# Patient Record
Sex: FEMALE | Race: WHITE | NOT HISPANIC OR LATINO | Employment: FULL TIME | URBAN - METROPOLITAN AREA
[De-identification: names, ages, dates, MRNs, and addresses within clinical notes are randomized per-mention and may not be internally consistent; named-entity substitution may affect disease eponyms.]

---

## 2018-08-30 ENCOUNTER — EVALUATION (OUTPATIENT)
Dept: PHYSICAL THERAPY | Facility: CLINIC | Age: 48
End: 2018-08-30
Payer: COMMERCIAL

## 2018-08-30 DIAGNOSIS — Z96.642 STATUS POST TOTAL HIP REPLACEMENT, LEFT: Primary | ICD-10-CM

## 2018-08-30 PROCEDURE — G8978 MOBILITY CURRENT STATUS: HCPCS

## 2018-08-30 PROCEDURE — 97161 PT EVAL LOW COMPLEX 20 MIN: CPT

## 2018-08-30 PROCEDURE — G8979 MOBILITY GOAL STATUS: HCPCS

## 2018-08-30 NOTE — PROGRESS NOTES
PT Evaluation     Today's date: 2018  Patient name: Prachi Paiz  : 1970  MRN: [id number]  Referring provider: Kale Kahn MD  Dx:   Encounter Diagnosis     ICD-10-CM    1  Status post total hip replacement, left N21 078        Start Time: 1435  Stop Time: 1530  Total time in clinic (min): 55 minutes    Assessment  Impairments: abnormal gait, abnormal muscle firing, abnormal or restricted ROM, abnormal movement, activity intolerance, impaired balance, impaired physical strength, lacks appropriate home exercise program, pain with function and poor body mechanics  Functional limitations: Comunity ambulation, stair negotiation, squatting  Assessment details: Prachi Paiz is a 49 y o  female who presents with pain, decreased strength, decreased ROM, decreased joint mobility, ambulatory dysfunction and postural  dysfunction  Due to these impairments, patient has difficulty performing ADL's, recreational activities, work-related activities, engaging in social activities, ambulation, stair negotiation, transfers  Patient's clinical presentation is consistent with their referring diagnosis of Status post total hip replacement, left  (primary encounter diagnosis)  Patient has been educated in home exercise program and plan of care  Patient would benefit from skilled physical therapy services to address their aforementioned functional limitations and progress towards prior level of function and independence with home exercise program      Understanding of Dx/Px/POC: good   Prognosis: good    Goals  Short Term Goals: Target Date 18  1  Initiate and advance HEP  2  Increase left hip AROM by at least 20 deg  3  Increase left LE MMT by at least 1/2 grade  4  Pt will be able to ambulate community distances without cane  5  Pt will be able to ascend stairs with a Reciprocal pattern with UE support   6   Pt will be able to squat to approx 90 deg knee flexion using proper form with PT cuing    Long Term Goals: Target Date 10/11/18  1  Indep with HEP  2  Increase right hip AROM to WNL in all planes  3  Increase left LE MMT to at least 4+/5  4  Pt will be able to ambulate community distances without gross deviation  5  Pt will be able to negotiate stairs with a reciprocal pattern without UE support   6  Pt will be able to perform a full squat using proper form without PT cuing  7  Pt will be able to return to PLOF including LE resistance training and cycling without limitation             Plan  Patient would benefit from: skilled PT  Planned modality interventions: cryotherapy, electrical stimulation/Russian stimulation and thermotherapy: hydrocollator packs  Planned therapy interventions: joint mobilization, manual therapy, patient education, postural training, activity modification, abdominal trunk stabilization, body mechanics training, flexibility, functional ROM exercises, graded exercise, home exercise program, neuromuscular re-education, strengthening, stretching, therapeutic activities, therapeutic exercise, motor coordination training, muscle pump exercises, gait training, balance/weight bearing training and ADL training  Frequency: 2x week  Duration in weeks: 6  Plan of Care beginning date: 8/30/2018  Plan of Care expiration date: 10/11/2018  Treatment plan discussed with: patient        Subjective Evaluation    History of Present Illness  Mechanism of injury: Pt reports of an approx 4 year history of L hip pain that began with a  "clicking" sensation during ambulation  Over time the pain progressed, and she began experiencing sharp pain with hip IR  Saw an ortho who performed performed x-ray (OA, osteoporosis) and MRI  Pain has been managed conservatively with injections x4 over the past year and PRP injection with good relief  Pain continued to increase post injections, so she decided to undergo a L THR on 8/21/18 with Dr Shira Gee    Pt received home PT for 3 visits before D/C, and states she has been ambulating 1 5 to 2 miles  Quality of life: good    Pain  Current pain ratin  At best pain ratin  At worst pain ratin (After walking 2  miles)  Location: L Groin and Ant thigh (Occasional L piriformis/"nerve pain")  Relieving factors: ice and rest  Aggravating factors: sitting (Walking >1 5 mils, don socks/shoes)  Progression: improved    Social Support  Steps to enter house: yes (2)  Stairs in house: yes (14)   Lives in: multiple-level home      Diagnostic Tests  X-ray: abnormal  MRI studies: abnormal  Treatments  Previous treatment: physical therapy  Current treatment: physical therapy  Patient Goals  Patient goals for therapy: decreased pain, improved balance, increased motion and return to sport/leisure activities  Patient goal: Return to cycling, LE resistance training and walking for exercise        Objective     Static Posture   General Observations  Asymmetrical weight bearing and shifted right  Comments  Pt reports L leg length discrepancy was fixed during the procedure    Observations   Left Hip  Positive for incision  Tenderness   Left Knee   Tenderness in the patellar tendon  Lumbar Screen  Lumbar range of motion within normal limits      Active Range of Motion   Left Hip   Flexion: 90 degrees with pain  Extension: 10 degrees   Abduction: 25 degrees   External rotation (90/90): 35 degrees   External rotation (prone): 10 degrees with pain    Right Hip   Flexion: 110 degrees   Extension: 20 degrees   Abduction: 40 degrees   External rotation (90/90): 85 degrees   External rotation (prone): 15 degrees   Left Knee   Prone flexion: 90 degrees with pain    Right Knee   Normal active range of motion  Prone flexion: 120 degrees   Left Ankle/Foot   Normal active range of motion    Right Ankle/Foot   Normal active range of motion    Passive Range of Motion   Left Hip   Flexion: 100 degrees   Abduction: 30 degrees     Right Hip   Normal passive range of motion    Strength/Myotome Testing Left Hip   Planes of Motion   Flexion: 3-  Extension: 4  Abduction: 3+  Adduction: 2+  External rotation: 3-  Internal rotation: 2+    Right Hip   Normal muscle strength    Left Knee   Flexion: 3+  Extension: 4- (pain)    Right Knee   Flexion: WFL  Extension: WFL    Left Ankle/Foot   Dorsiflexion: 5  Plantar flexion: 4+    Right Ankle/Foot   Normal strength    Tests     Left Hip   Modified Arsh: Positive  90/90 SLR: Positive  SLR: Positive  Ambulation     Ambulation: Stairs   Pattern: non-reciprocal  Railings: one rail  Pattern: non-reciprocal  Railings: one rail    Observational Gait   Gait: antalgic   Decreased walking speed, left swing time and left step length  Left foot contact pattern: foot flat    Functional Assessment     Comments  Squat:  Pt is able to squat to approx 100 deg knee flexion demonstrating R weight shift and increased lumbar flexion secondary to hip flexion restriction  Pt reports of anterior hip discomfort        Flowsheet Rows      Most Recent Value   PT/OT G-Codes   Current Score  47   Projected Score  69   FOTO information reviewed  Yes   Assessment Type  Evaluation   G code set  Mobility: Walking & Moving Around   Mobility: Walking and Moving Around Current Status ()  CK   Mobility: Walking and Moving Around Goal Status ()  CJ          Precautions: L Ant THR    Specialty Daily Treatment Diary       Manual 8/30/18       PROM        STM        Joint mobs        Man Flexibility                        Exercise Diary         Rec bike        Supine clamshells 10x5"       Ball squeeze 10x5"       Prone quad stretch 3x30"       Bridges        Quarter squats        Towel hip flexion 10x10"       Hamstring stretch 10x10"                                                                               Modalities

## 2018-09-04 ENCOUNTER — OFFICE VISIT (OUTPATIENT)
Dept: PHYSICAL THERAPY | Facility: CLINIC | Age: 48
End: 2018-09-04
Payer: COMMERCIAL

## 2018-09-04 DIAGNOSIS — Z96.642 STATUS POST TOTAL HIP REPLACEMENT, LEFT: Primary | ICD-10-CM

## 2018-09-04 PROCEDURE — 97140 MANUAL THERAPY 1/> REGIONS: CPT

## 2018-09-04 PROCEDURE — 97110 THERAPEUTIC EXERCISES: CPT

## 2018-09-04 NOTE — PROGRESS NOTES
Daily Note     Today's date: 2018  Patient name: Tenzin Allen  : 1970  MRN: [id number]  Referring provider: John Denny MD  Dx:   Encounter Diagnosis   Name Primary?  Status post total hip replacement, left Yes       Start Time: 930  Stop Time: 102  Total time in clinic (min): 50 minutes    Subjective: Pt reports her hip has been feeling much better since performing hip flexor/quad stretching   Mild soreness with prolonged positioning, but improved with motion  Able to don/doff socks and shoes with less pain  Pain Ratin/10    Objective: See treatment diary below  Precautions: L Ant THR    Specialty Daily Treatment Diary       Manual 18       PROM All planes        IASTM Quads/ITB/ adductors w/the stick       Joint mobs        Man Flexibility HS/Quads                       Exercise Diary         Stationary bike x10 mins L5       Supine clamshells 20x5" green       Ball squeeze 10x5"/10x5" w/hip flex       Prone quad stretch        Bridges On PB 20x3"       Wall squats 20x2" on PB       PPU  2x10       TKE [place] 20x5" 6 0       Lat band walks  6x25ft red       Step ups 2x10 8in                                                               Modalities                            Assessment: Pt demonstrates improved hip mobility in all planes, most notably ER  Passive flex to approx 105 deg with decreased lumbar compensation  Using AD intermittently with community ambulation, no longer uses at home  Plan: Continue per plan of care  Progress treatment as tolerated

## 2018-09-05 ENCOUNTER — OFFICE VISIT (OUTPATIENT)
Dept: PHYSICAL THERAPY | Facility: CLINIC | Age: 48
End: 2018-09-05
Payer: COMMERCIAL

## 2018-09-05 DIAGNOSIS — Z96.642 STATUS POST TOTAL HIP REPLACEMENT, LEFT: Primary | ICD-10-CM

## 2018-09-05 PROCEDURE — 97110 THERAPEUTIC EXERCISES: CPT

## 2018-09-05 PROCEDURE — 97140 MANUAL THERAPY 1/> REGIONS: CPT

## 2018-09-05 PROCEDURE — 97112 NEUROMUSCULAR REEDUCATION: CPT

## 2018-09-05 NOTE — PROGRESS NOTES
Daily Note     Today's date: 2018  Patient name: Simon Jaffe  : 1970  MRN: [id number]  Referring provider: Dawn Thomson MD  Dx:   Encounter Diagnosis   Name Primary?  Status post total hip replacement, left Yes       Start Time: 1045  Stop Time: 1140  Total time in clinic (min): 55 minutes    Subjective: Pt reports of serge LE muscle soreness today but denies hip pain  States she awaoke and was able to walk without limping  Has not had L knee pain since beginning rectus femoris stretching  Pain Ratin/10    Objective: See treatment diary below  Precautions: L [facility]    Specialty Daily Treatment Diary       Manual 18      PROM All planes  All planes      IASTM Quads/ITB/ adductors w/the stick Quads/ITB/ adductors w/the stick      Joint mobs        Man Flexibility HS/Quads HS/Rectus fem                      Exercise Diary         Stationary bike x10 mins L5 x10 mins L5      Supine clamshells 20x5" green s/l 20x5" red       Ball squeeze 10x5"/10x5" w/hip flex 10x5"/10x5" w/hip flex      Prone quad stretch  5x20"      Bridges On PB 20x3" On PB 20x3"      Wall squats 20x2" on PB Table 2x10      PPU  2x10 2x10      TKE [place] 20x5" 6 0 Standing hip ext/abd/flex 20x3 serge 3lbs      Lat band walks  6x25ft red Prone ext/abd 2x10 serge      Step ups 2x10 8in Lat step down 2x10 serge 6in                                                              Modalities                            Assessment: Pt demonstrates improved prone knee flexion ROM without c/o  Cuing required with standing activity to prevent R LE weight shift  Challenged by Sl eccentric step downs  Plan: Continue per plan of care  Progress treatment as tolerated

## 2018-09-06 ENCOUNTER — OFFICE VISIT (OUTPATIENT)
Dept: PHYSICAL THERAPY | Facility: CLINIC | Age: 48
End: 2018-09-06
Payer: COMMERCIAL

## 2018-09-06 DIAGNOSIS — Z96.642 STATUS POST TOTAL HIP REPLACEMENT, LEFT: Primary | ICD-10-CM

## 2018-09-06 PROCEDURE — 97140 MANUAL THERAPY 1/> REGIONS: CPT

## 2018-09-06 PROCEDURE — 97110 THERAPEUTIC EXERCISES: CPT

## 2018-09-06 PROCEDURE — 97112 NEUROMUSCULAR REEDUCATION: CPT

## 2018-09-06 NOTE — PROGRESS NOTES
Daily Note     Today's date: 2018  Patient name: Mary Ellen James  : 1970  MRN: [id number]  Referring provider: Michael Ramírez MD  Dx:   Encounter Diagnosis   Name Primary?  Status post total hip replacement, left Yes       Start Time: 1400  Stop Time: 1450  Total time in clinic (min): 50 minutes    Subjective: Pt reports of mild soreness after previous session  States she forgot to take her celebrex and had mild pain this morning, abolished once she took her medication  Pain Ratin/10    Objective: See treatment diary below  Precautions: L Ant [facility]    Specialty Daily Treatment Diary       Manual 18     PROM All planes  All planes All planes     IASTM Quads/ITB/ adductors w/the stick Quads/ITB/ adductors w/the stick Quads/ITB/ adductors w/the stick     Joint mobs        Man Flexibility HS/Quads HS/Rectus fem HS/Rectus fem                     Exercise Diary         Stationary bike x10 mins L5 x10 mins L5 x10 mins L1     Supine clamshells 20x5" green s/l 20x5" red  s/l 20x5"     Ball squeeze 10x5"/10x5" w/hip flex 10x5"/10x5" w/hip flex LTR 20x5" serge     Prone quad stretch  5x20" 5x20"     Bridges On PB 20x3" On PB 20x3" DKTC on PB 20x10"     Wall squats 20x2" on PB Table 2x10 Table 2x10     PPU  2x10 2x10 2x10     TKE [place] 20x5" 6 0 Standing hip ext/abd/flex 20x3 serge 3lbs [place] 20x5" 7 0     Lat band walks  6x25ft red Prone ext/abd 2x10 serge SLR abd 2x10     Step ups 2x10 8in Lat step down 2x10 serge 6in 2x10 8in                                                             Modalities                            Assessment: Pt continues to demonstrate improved hip mobility in all planes  Continues to require cuing to correct R deviation with increased squat depth  Advised to rest for a few days over the weekend, but continue with walking and stretching  Plan: Continue per plan of care  Progress treatment as tolerated

## 2018-09-11 ENCOUNTER — OFFICE VISIT (OUTPATIENT)
Dept: PHYSICAL THERAPY | Facility: CLINIC | Age: 48
End: 2018-09-11
Payer: COMMERCIAL

## 2018-09-11 DIAGNOSIS — Z96.642 STATUS POST TOTAL HIP REPLACEMENT, LEFT: Primary | ICD-10-CM

## 2018-09-11 PROCEDURE — 97110 THERAPEUTIC EXERCISES: CPT

## 2018-09-11 PROCEDURE — 97140 MANUAL THERAPY 1/> REGIONS: CPT

## 2018-09-11 PROCEDURE — 97112 NEUROMUSCULAR REEDUCATION: CPT

## 2018-09-11 NOTE — PROGRESS NOTES
Daily Note     Today's date: 2018  Patient name: Desiree Spine  : 1970  MRN: [id number]  Referring provider: Fely Flores MD  Dx:   Encounter Diagnosis   Name Primary?  Status post total hip replacement, left Yes       Start Time: 1720  Stop Time: 1830  Total time in clinic (min): 70 minutes    Subjective: Pt reports she has been able to increase her walking to 3 miles with minimal difficulty  Has tightness post, denies pain or increased soreness  Was able to stand and pedal on her spin bike this weekend, and has decreased soreness and difficulty with don/doffing shoes and socks    Pain Ratin/10    Objective: See treatment diary below  Precautions: L Ant [facility]    Specialty Daily Treatment Diary       Manual 18    PROM All planes  All planes All planes All planes    IASTM Quads/ITB/ adductors w/the stick Quads/ITB/ adductors w/the stick Quads/ITB/ adductors w/the stick Quads/ITB/ adductors w/the stick    Joint mobs        Man Flexibility HS/Quads HS/Rectus fem HS/Rectus fem HS/Rectus fem                    Exercise Diary         Stationary bike x10 mins L5 x10 mins L5 x10 mins L1 x5 mins L5/ Eliptical x5 mins L7 retro    Supine clamshells 20x5" green s/l 20x5" red  s/l 20x5" Short bridge 20x5" red    Ball squeeze 10x5"/10x5" w/hip flex 10x5"/10x5" w/hip flex LTR 20x5" serge     Prone quad stretch  5x20" 5x20" 5x20"    Bridges On PB 20x3" On PB 20x3" DKTC on PB 20x10" SL 10x3"/On PB 10x3"/alt SLR x10bil    Wall squats 20x2" on PB Table 2x10 Table 2x10 20x5"    PPU  2x10 2x10 2x10 2x10    TKE [place] 20x5" 6 0 Standing hip ext/abd/flex 20x3 serge 3lbs [place] 20x5" 7 0 [place] 3 way hip x15ea 1 5    Lat band walks  6x25ft red Prone ext/abd 2x10 serge SLR abd 2x10 4x25ft red w/squat    Step ups 2x10 8in Lat step down 2x10 serge 6in 2x10 8in     BOSU step ups    20x3" serge    Rev Lunges    w/sliders x10 serge                                            Modalities Assessment: Pt challeneged by L LE stabilization activity but denies pain  Requires cuing to prevent excessive L toe out  Plan: Continue per plan of care  Progress treatment as tolerated

## 2018-09-12 ENCOUNTER — OFFICE VISIT (OUTPATIENT)
Dept: PHYSICAL THERAPY | Facility: CLINIC | Age: 48
End: 2018-09-12
Payer: COMMERCIAL

## 2018-09-12 DIAGNOSIS — Z96.642 STATUS POST TOTAL HIP REPLACEMENT, LEFT: Primary | ICD-10-CM

## 2018-09-12 PROCEDURE — 97140 MANUAL THERAPY 1/> REGIONS: CPT

## 2018-09-12 PROCEDURE — 97110 THERAPEUTIC EXERCISES: CPT

## 2018-09-12 PROCEDURE — 97112 NEUROMUSCULAR REEDUCATION: CPT

## 2018-09-12 NOTE — PROGRESS NOTES
Daily Note     Today's date: 2018  Patient name: Michelle Freeman  : 1970  MRN: [id number]  Referring provider: Francisco Jones MD  Dx:   Encounter Diagnosis   Name Primary?  Status post total hip replacement, left Yes       Start Time: 1045  Stop Time: 1135  Total time in clinic (min): 50 minutes    Subjective: Pt reports of mild soreness after previous session  Felt better after walking this morning    Pain Ratin/10    Objective: See treatment diary below  [facility]    Specialty Daily Treatment Diary       Manual 18   PROM All planes  All planes All planes All planes All planes   IASTM Quads/ITB/ adductors w/the stick Quads/ITB/ adductors w/the stick Quads/ITB/ adductors w/the stick Quads/ITB/ adductors w/the stick Quads/ITB/ adductors w/the stick   Joint mobs        Man Flexibility HS/Quads HS/Rectus fem HS/Rectus fem HS/Rectus fem HS/Rectus fem                   Exercise Diary         Stationary bike x10 mins L5 x10 mins L5 x10 mins L1 x5 mins L5/ Eliptical x5 mins L7 retro Eliptical 5min fwd/retro ea   Supine clamshells 20x5" green s/l 20x5" red  s/l 20x5" Short bridge 20x5" red s/l 20x5" red   Ball squeeze 10x5"/10x5" w/hip flex 10x5"/10x5" w/hip flex LTR 20x5" serge  10x5"/10x5" w/hip flex   Prone quad stretch  5x20" 5x20" 5x20" 5x20"   Bridges On PB 20x3" On PB 20x3" DKTC on PB 20x10" SL 10x3"/On PB 10x3"/alt SLR x10bil SKTC 20x10"   Wall squats 20x2" on PB Table 2x10 Table 2x10 20x5" Table [id number]   PPU  2x10 2x10 2x10 2x10 2x10   TKE [place] 20x5" 6 0 Standing hip ext/abd/flex 20x3 serge 3lbs [place] 20x5" 7 0 [place] 3 way hip x15ea 1 5 Seated knee/hip ext 2x10/standing 2x10 5 lbs   Lat band walks  6x25ft red Prone ext/abd 2x10 serge SLR abd 2x10 4x25ft red w/squat 6x25ft red   Step ups 2x10 8in Lat step down 2x10 serge 6in 2x10 8in  Lat step down 2x10 serge 8in   BOSU step ups    20x3" serge    Rev Lunges    w/sliders x10 serge Modalities                            Assessment: Pt demonstrates improved knee flexion in prone without c/o pain  Fatigues easily with hip flexion strengthening  Plan: Continue per plan of care  Progress treatment as tolerated

## 2018-09-13 ENCOUNTER — OFFICE VISIT (OUTPATIENT)
Dept: PHYSICAL THERAPY | Facility: CLINIC | Age: 48
End: 2018-09-13
Payer: COMMERCIAL

## 2018-09-13 DIAGNOSIS — Z96.642 STATUS POST TOTAL HIP REPLACEMENT, LEFT: Primary | ICD-10-CM

## 2018-09-13 PROCEDURE — 97110 THERAPEUTIC EXERCISES: CPT

## 2018-09-13 PROCEDURE — 97140 MANUAL THERAPY 1/> REGIONS: CPT

## 2018-09-13 PROCEDURE — 97112 NEUROMUSCULAR REEDUCATION: CPT

## 2018-09-13 NOTE — PROGRESS NOTES
Daily Note     Today's date: 2018  Patient name: Sage Mcfadden  : 1970  MRN: [id number]  Referring provider: Ivon Decker MD  Dx:   Encounter Diagnosis   Name Primary?  Status post total hip replacement, left Yes       Start Time: 930  Stop Time: 1020  Total time in clinic (min): 50 minutes    Subjective: Pt reports of increased fatigue today  Was able to cross her legs to don shoes this morning  Pain Ratin/10    Objective: See treatment diary below  Precautions: L Ant [facility]    Specialty Daily Treatment Diary       Manual 18   PROM All planes   All planes All planes All planes   IASTM Quads/ITB/ adductors w/the stick  Quads/ITB/ adductors w/the stick Quads/ITB/ adductors w/the stick Quads/ITB/ adductors w/the stick   Joint mobs        Man Flexibility HS/Rectus fem/piriformis  HS/Rectus fem HS/Rectus fem HS/Rectus fem                   Exercise Diary         Eliptical 5min fwd/retro ea L7  x10 mins L1 x5 mins L5/ Eliptical x5 mins L7 retro Eliptical 5min fwd/retro ea   Supine clamshells Short bridge 20x5"  s/l 20x5" Short bridge 20x5" red s/l 20x5" red   Ball squeeze   LTR 20x5" serge  10x5"/10x5" w/hip flex   Prone quad stretch 5x20"  5x20" 5x20" 5x20"   Bridges SL 10x3"/DL 20x3"  DKTC on PB 20x10" SL 10x3"/On PB 10x3"/alt SLR x10bil SKTC 20x10"   Wall squats 20x5" on PB  Table 2x10 20x5" Table 2x10   PPU  2x10  2x10 2x10 2x10   [place] 3 way hip  x15ea 1 5  [place] 20x5" 7 0 [place] 3 way hip x15ea 1 5 Seated knee/hip ext 2x10/standing 2x10 5 lbs   Lat band walks    SLR abd 2x10 4x25ft red w/squat 6x25ft red   Step ups 2x10 12in  2x10 8in  Lat step down 2x10 serge 8in   BOSU step ups    20x3" serge    Rev Lunges W/sliders x10 serge   w/sliders x10 serge                                            Modalities                            Assessment: Pt demonstrates near full AROM in all planes  Mild L glute weakness and decreased dynamic stability compared to R    Advised to rest over the weekend, focusing on walking/biking and general mobility  Plan: Continue per plan of care  Progress treatment as tolerated

## 2018-09-18 ENCOUNTER — OFFICE VISIT (OUTPATIENT)
Dept: PHYSICAL THERAPY | Facility: CLINIC | Age: 48
End: 2018-09-18
Payer: COMMERCIAL

## 2018-09-18 DIAGNOSIS — Z96.642 STATUS POST TOTAL HIP REPLACEMENT, LEFT: Primary | ICD-10-CM

## 2018-09-18 PROCEDURE — 97110 THERAPEUTIC EXERCISES: CPT

## 2018-09-18 PROCEDURE — 97140 MANUAL THERAPY 1/> REGIONS: CPT

## 2018-09-18 PROCEDURE — 97112 NEUROMUSCULAR REEDUCATION: CPT

## 2018-09-18 NOTE — PROGRESS NOTES
Daily Note     Today's date: 2018  Patient name: Murtaza Robbins  : 1970  MRN: [id number]  Referring provider: Chhaya Sesay MD  Dx:   Encounter Diagnosis   Name Primary?  Status post total hip replacement, left Yes       Start Time: 930  Stop Time: 1030  Total time in clinic (min): 60 minutes    Subjective: Pt reports she did walks of 3 and 4 miles this weekend without AD  Reports her only c/o is prolinged sitting, especially on low surfaces    Pain Ratin/10    Objective: See treatment diary below  Precautions: L Ant [facility]    Specialty Daily Treatment Diary       Manual 18   PROM All planes  All planes  All planes All planes   IASTM Quads/ITB/ adductors w/the stick Quads/ITB/ adductors w/the stick  Quads/ITB/ adductors w/the stick Quads/ITB/ adductors w/the stick   Joint mobs        Man Flexibility HS/Rectus fem/piriformis HS/Rectus fem/piriformis  HS/Rectus fem HS/Rectus fem                   Exercise Diary         Eliptical 5min fwd/retro ea L7 5min fwd/retro ea L7  x5 mins L5/ Eliptical x5 mins L7 retro Eliptical 5min fwd/retro ea   Supine clamshells Short bridge 20x5" red w/abd Short bridge 20x5" red w/abd  Short bridge 20x5" red s/l 20x5" red   Ball squeeze     10x5"/10x5" w/hip flex   Prone quad stretch 5x20" 5x20"  5x20" 5x20"   Bridges On PB 10 w/alt SLR x10 serge and bridge and curl x10 On PB 10 w/alt SLR x10 serge and bridge and curl x10  SL 10x3"/On PB 10x3"/alt SLR x10bil SKTC 20x10"   Wall squats 20x5" on PB 2x10 on PB  20x5" Table 2x10   PPU  2x10 2x10  2x10 2x10   [place] 3 way hip  x15ea 2 0  x15ea 2 0  [place] 3 way hip x15ea 1 5 Seated knee/hip ext 2x10/standing 2x10 5 lbs   Lat band walks  6x25ft red 6x25ft red w/squat  4x25ft red w/squat 6x25ft red   Step ups 2x10 12in 2x10 14 in   Lat step down 2x10 serge 8in   BOSU lateral step overs 2x20 serge 2x20 serge  20x3" serge    Rev Lunges w/sliders x10 serge   w/sliders x10 serge    Supine hip flexor stretch  3x30" Hip wall stabilization                                Modalities                            Assessment: Pt demonstrates full ROM in all planes except hip flexion, requires cuing to prevent lumbar compensation  Challenged by activity but able to perform without pain  Re-eval upon next visit  Plan: Continue per plan of care  Progress treatment as tolerated

## 2018-09-19 ENCOUNTER — OFFICE VISIT (OUTPATIENT)
Dept: PHYSICAL THERAPY | Facility: CLINIC | Age: 48
End: 2018-09-19
Payer: COMMERCIAL

## 2018-09-19 ENCOUNTER — TRANSCRIBE ORDERS (OUTPATIENT)
Dept: PHYSICAL THERAPY | Facility: CLINIC | Age: 48
End: 2018-09-19

## 2018-09-19 DIAGNOSIS — Z96.642 STATUS POST LEFT HIP REPLACEMENT: Primary | ICD-10-CM

## 2018-09-19 DIAGNOSIS — Z96.642 STATUS POST TOTAL HIP REPLACEMENT, LEFT: Primary | ICD-10-CM

## 2018-09-19 PROCEDURE — 97110 THERAPEUTIC EXERCISES: CPT

## 2018-09-19 PROCEDURE — 97112 NEUROMUSCULAR REEDUCATION: CPT

## 2018-09-19 PROCEDURE — G8978 MOBILITY CURRENT STATUS: HCPCS

## 2018-09-19 PROCEDURE — 97140 MANUAL THERAPY 1/> REGIONS: CPT

## 2018-09-19 PROCEDURE — G8979 MOBILITY GOAL STATUS: HCPCS

## 2018-09-19 NOTE — LETTER
2018    Megan Mahoney MD  [address] [facility]    Patient: Simon Jaffe   YOB: 1970   Date of Visit: 2018     Encounter Diagnosis     ICD-10-CM    1  Status post total hip replacement, left J20 [id number]        Dear Dr Lovely Houselar:    Please review the attached Plan of Care from [facility] recent visit  Please verify that you agree therapy should continue by signing the attached document and sending it back to our office  If you have any questions or concerns, please don't hesitate to call  Sincerely,    Terrance Diez PT      Referring Provider:      I certify that I have read the below Plan of Care and certify the need for these services furnished under this plan of treatment while under my care  Megan Mahoney MD  [facility] [address]  VIA Facsimile: [phone]          PT Re-Evaluation     Today's date: 2018  Patient name: Simon Jaffe  : 1970  MRN: [id number]  Referring provider: Dawn Thomson MD  Dx:   Encounter Diagnosis     ICD-10-CM    1  Status post total hip replacement, left [id number]        Start Time: 1000  Stop Time: 1050  Total time in clinic (min): 50 minutes    Assessment  Impairments: abnormal or restricted ROM, activity intolerance, impaired physical strength, lacks appropriate home exercise program, pain with function and poor body mechanics  Functional limitations: Squatting, prolonged sitting, don/doffing socks/shoes  Assessment details: To date, Lady Rojas has received 9 Physical Therapy visits consisting of manual therapy techniques, neuromuscular re-education and therapeutic exercises for Status post total hip replacement, left  (primary encounter diagnosis)    Patient demonstrates decreased pain, increased strength, increased ROM, increased joint mobility, decreased joint effusion, improved body mechanics, improved gait mechanics , improved posture  and increased activity tolerance and has been able to return to ambulating for exercise without AD and indep with all functional activity including don/doffing socks and shoes with minimal difficulty   Fabianobrandy Freedom reports of continued pain/difficulty with prolonged sitting, especially on lower surfaces and deep squatting/lifting  She has not been able to return to premorbid exercise regimen at this time  Patient would benefit from continued skilled Physical Therapy services to progress towards prior level of function and independence with home exercise program   See updated goals below  Understanding of Dx/Px/POC: good   Prognosis: good    Goals  Short Term Goals: Target Date 09/20/18  1  Initiate and advance HEP - achieved  2  Increase left hip AROM by at least 20 deg - achieved  3  Increase left LE MMT by at least 1/2 grade - achieved  4  Pt will be able to ambulate community distances without cane - achieved  5  Pt will be able to ascend stairs with a Reciprocal pattern with UE support  - achieved  6  Pt will be able to squat to approx 90 deg knee flexion using proper form with PT cuing - achieved    Long Term Goals: Target Date 10/17/18  1  Indep with HEP  2  Increase right hip AROM to WNL in all planes  3  Increase left LE MMT to at least 4+/5  4  Pt will be able to ambulate community distances without gross deviation  5  Pt will be able to negotiate stairs with a reciprocal pattern without UE support   6  Pt will be able to perform a full squat using proper form without PT cuing  7   Pt will be able to return to PLOF including LE resistance training and cycling without limitation             Plan  Patient would benefit from: skilled PT  Planned modality interventions: cryotherapy, electrical stimulation/Russian stimulation and thermotherapy: hydrocollator packs  Planned therapy interventions: joint mobilization, manual therapy, patient education, postural training, activity modification, abdominal trunk stabilization, body mechanics training, flexibility, functional ROM exercises, graded exercise, home exercise program, neuromuscular re-education, strengthening, stretching, therapeutic activities, therapeutic exercise, motor coordination training, muscle pump exercises, gait training, balance/weight bearing training and ADL training  Frequency: 2x week  Duration in weeks: 4  Plan of Care beginning date: 2018  Plan of Care expiration date: 10/17/2018  Treatment plan discussed with: patient        Subjective Evaluation    History of Present Illness  Mechanism of injury: Pt reports of an approx 4 year history of L hip pain that began with a  "clicking" sensation during ambulation  Over time the pain progressed, and she began experiencing sharp pain with hip IR  Saw an ortho who performed performed x-ray (OA, osteoporosis) and MRI  Pain has been managed conservatively with injections x4 over the past year and PRP injection with good relief  Pain continued to increase post injections, so she decided to undergo a L THR on 18 with Dr Hellen Karimi  Pt received home PT for 3 visits before D/C, and states she has been ambulating 1 5 to 2 miles  2018: To date, pt has received 9 PT visits  Overall, Luis M Olsen reports of a 50% improvement in function since beginning PT  Pt demonstrates improved body mechanics with squatting and has been able to return to ambulating community distances without AD  Luis M Olsen reports of continued pain/difficulty with prolinged sitting, transfers from low surfaces and has been unable to return to premorbid exercise regimen  Pt would benefit from continued skilled PT services 2-3x/week x 4 weeks to progress towards PLOF and indep with HEP        Quality of life: good    Pain  Current pain ratin  At best pain ratin  At worst pain rating: 3  Location: L Ant thigh (Occasional L piriformis/"nerve pain")  Quality: dull ache and pressure  Relieving factors: ice and rest  Aggravating factors: sitting (Prolonged sitting, deep squatting)  Progression: improved    Social Support  Steps to enter house: yes (2)  Stairs in house: yes (14)   Lives in: multiple-level home      Diagnostic Tests  X-ray: abnormal  MRI studies: abnormal  Treatments  Previous treatment: physical therapy  Current treatment: physical therapy  Patient Goals  Patient goals for therapy: decreased pain, improved balance, increased motion and return to sport/leisure activities  Patient goal: Return to cycling, LE resistance training and walking for exercise (Partially achieved, walking up to 4 miles )        Objective     Static Posture   General Observations  Asymmetrical weight bearing  Comments  Pt presents with normal posture, will increase R LE WBing with prolonged standing and requires occasional cuing to stand equal WBing    Observations   Left Hip  Positive for incision  Additional Observation Details  Incision site demo good healing without sign of infection  Pt instructed in scar mobs for HEP    Lumbar Screen  Lumbar range of motion within normal limits      Active Range of Motion   Left Hip   Flexion: 112 degrees with pain  Extension: 20 degrees   Abduction: 40 degrees   External rotation (90/90): 80 degrees   Internal rotation (90/90): 20 degrees     Right Hip   Flexion: 127 degrees   Extension: 20 degrees   Abduction: 40 degrees   External rotation (90/90): 85 degrees   Internal rotation (90/90): 15 degrees   Left Knee   Normal active range of motion    Right Knee   Normal active range of motion  Left Ankle/Foot   Normal active range of motion    Right Ankle/Foot   Normal active range of motion    Passive Range of Motion   Left Hip   Flexion: 120 degrees with pain    Right Hip   Normal passive range of motion    Strength/Myotome Testing     Left Hip   Planes of Motion   Flexion: 4-  Extension: 4+  Abduction: 4  Adduction: 4+  External rotation: 4  Internal rotation: 4    Right Hip   Normal muscle strength    Left Knee Flexion: 4  Extension: 4+    Right Knee   Normal strength    Left Ankle/Foot   Normal strength    Right Ankle/Foot   Normal strength    Tests     Left Hip   Modified Arsh: Positive  Ambulation     Ambulation: Stairs   Pattern: reciprocal  Railings: one rail  Pattern: reciprocal  Railings: one rail    Observational Gait   Gait: within functional limits   Decreased walking speed  Additional Observational Gait Details  Pt ambulates with a mostly normalized pattern other than slight decrease in heaven and decreased L hip ext during swing    Functional Assessment     Single Leg Stance   Left: 30 seconds  Right: 30 seconds    Comments  Squat:  Pt is able to squat to approx 90 deg knee flexion demonstrating mild increase in lumbar flexion secondary to hip flexion restriction    Denies c/o      Flowsheet Rows      Most Recent Value   PT/OT G-Codes   Current Score  49   Projected Score  69   Assessment Type  Re-evaluation   G code set  Mobility: Walking & Moving Around   Mobility: Walking and Moving Around Current Status ()  CK   Mobility: Walking and Moving Around Goal Status ()  CJ          Precautions: L [facility]    Specialty Daily Treatment Diary       Manual 9/13/18 9/18/18 9/19/18 9/12/18   PROM All planes  All planes All planes  All planes   IASTM Quads/ITB/ adductors w/the stick Quads/ITB/ adductors w/the stick Quads/ITB/ adductors w/the stick  Quads/ITB/ adductors w/the stick   Joint mobs        Man Flexibility HS/Rectus fem/piriformis HS/Rectus fem/piriformis HS/piriformis  HS/Rectus fem                   Exercise Diary         Eliptical 5min fwd/retro ea L7 5min fwd/retro ea L7   Eliptical 5min fwd/retro ea   Supine clamshells Short bridge 20x5" red w/abd Short bridge 20x5" red w/abd s/l 20x5" red  s/l 20x5" red   Ball squeeze   w/LTR 20x5" serge  10x5"/10x5" w/hip flex   Prone quad stretch 5x20" 5x20" 3x30"  5x20"   Bridges On PB 10 w/alt SLR x10 serge and bridge and curl x10 On PB 10 w/alt SLR x10 serge and bridge and curl x10 On PB w/alt SLR x10 serge and bridge and curl x10  SKTC 20x10"   Wall squats 20x5" on PB 2x10 on PB 30x5" on PB w/band abd red  Table 2x10   PPU  2x10 2x10 2x10  2x10   [place] 3 way hip  x15ea 2 0  x15ea 2 0 SLR 2x10/ Seated knee ext/hip flex 2x10  Seated knee/hip ext 2x10/standing 2x10 5 lbs   Lat band walks  6x25ft red 6x25ft red w/squat   6x25ft red   Step ups 2x10 12in 2x10 14 in Lat step down 2x10 serge 8in  Lat step down 2x10 serge 8in   BOSU lateral step overs 2x20 serge 2x20 serge      Rev Lunges w/sliders x10 serge  Prone hip ext 3x10 2lbs     Supine hip flexor stretch  3x30" 3x30"     Hip wall stabilization   45 deg 5x5"/90 deg 5x5"/ 90 deg w/ rot 5x5"     s/l hip abd/add   2x10 serge PB     Prayer stretch   20"x5             Modalities

## 2018-09-19 NOTE — PROGRESS NOTES
PT Re-Evaluation     Today's date: 2018  Patient name: Sandy Blake  : 1970  MRN: [id number]  Referring provider: Sena Dior MD  Dx:   Encounter Diagnosis     ICD-10-CM    1  Status post total hip replacement, left [id number]        Start Time: 1000  Stop Time: 1050  Total time in clinic (min): 50 minutes    Assessment  Impairments: abnormal or restricted ROM, activity intolerance, impaired physical strength, lacks appropriate home exercise program, pain with function and poor body mechanics  Functional limitations: Squatting, prolonged sitting, don/doffing socks/shoes  Assessment details: To date,  Ip has received 9 Physical Therapy visits consisting of manual therapy techniques, neuromuscular re-education and therapeutic exercises for Status post total hip replacement, left  (primary encounter diagnosis)  Patient demonstrates decreased pain, increased strength, increased ROM, increased joint mobility, decreased joint effusion, improved body mechanics, improved gait mechanics , improved posture  and increased activity tolerance and has been able to return to ambulating for exercise without AD and indep with all functional activity including don/doffing socks and shoes with minimal difficulty     reports of continued pain/difficulty with prolonged sitting, especially on lower surfaces and deep squatting/lifting  She has not been able to return to premorbid exercise regimen at this time  Patient would benefit from continued skilled Physical Therapy services to progress towards prior level of function and independence with home exercise program   See updated goals below  Understanding of Dx/Px/POC: good   Prognosis: good    Goals  Short Term Goals: Target Date 18  1  Initiate and advance HEP - achieved  2  Increase left hip AROM by at least 20 deg - achieved  3  Increase left LE MMT by at least 1/2 grade - achieved  4   Pt will be able to ambulate community distances without cane - achieved  5  Pt will be able to ascend stairs with a Reciprocal pattern with UE support  - achieved  6  Pt will be able to squat to approx 90 deg knee flexion using proper form with PT cuing - achieved    Long Term Goals: Target Date 10/17/18  1  Indep with HEP  2  Increase right hip AROM to WNL in all planes  3  Increase left LE MMT to at least 4+/5  4  Pt will be able to ambulate community distances without gross deviation  5  Pt will be able to negotiate stairs with a reciprocal pattern without UE support   6  Pt will be able to perform a full squat using proper form without PT cuing  7  Pt will be able to return to PLOF including LE resistance training and cycling without limitation             Plan  Patient would benefit from: skilled PT  Planned modality interventions: cryotherapy, electrical stimulation/Russian stimulation and thermotherapy: hydrocollator packs  Planned therapy interventions: joint mobilization, manual therapy, patient education, postural training, activity modification, abdominal trunk stabilization, body mechanics training, flexibility, functional ROM exercises, graded exercise, home exercise program, neuromuscular re-education, strengthening, stretching, therapeutic activities, therapeutic exercise, motor coordination training, muscle pump exercises, gait training, balance/weight bearing training and ADL training  Frequency: 2x week  Duration in weeks: 4  Plan of Care beginning date: 9/19/2018  Plan of Care expiration date: 10/17/2018  Treatment plan discussed with: patient        Subjective Evaluation    History of Present Illness  Mechanism of injury: Pt reports of an approx 4 year history of L hip pain that began with a  "clicking" sensation during ambulation  Over time the pain progressed, and she began experiencing sharp pain with hip IR  Saw an ortho who performed performed x-ray (OA, osteoporosis) and MRI   Pain has been managed conservatively with injections x4 over the past year and PRP injection with good relief  Pain continued to increase post injections, so she decided to undergo a L THR on 18 with Dr Hellen Karimi  Pt received home PT for 3 visits before D/C, and states she has been ambulating 1 5 to 2 miles  2018: To date, pt has received 9 PT visits  Overall, Luis M Pretty reports of a 50% improvement in function since beginning PT  Pt demonstrates improved body mechanics with squatting and has been able to return to ambulating community distances without AD  Luis M Olsen reports of continued pain/difficulty with prolinged sitting, transfers from low surfaces and has been unable to return to premorbid exercise regimen  Pt would benefit from continued skilled PT services 2-3x/week x 4 weeks to progress towards PLOF and indep with HEP  Quality of life: good    Pain  Current pain ratin  At best pain ratin  At worst pain rating: 3  Location: L Ant thigh (Occasional L piriformis/"nerve pain")  Quality: dull ache and pressure  Relieving factors: ice and rest  Aggravating factors: sitting (Prolonged sitting, deep squatting)  Progression: improved    Social Support  Steps to enter house: yes (2)  Stairs in house: yes (14)   Lives in: multiple-level home      Diagnostic Tests  X-ray: abnormal  MRI studies: abnormal  Treatments  Previous treatment: physical therapy  Current treatment: physical therapy  Patient Goals  Patient goals for therapy: decreased pain, improved balance, increased motion and return to sport/leisure activities  Patient goal: Return to cycling, LE resistance training and walking for exercise (Partially achieved, walking up to 4 miles )        Objective     Static Posture   General Observations  Asymmetrical weight bearing  Comments  Pt presents with normal posture, will increase R LE WBing with prolonged standing and requires occasional cuing to stand equal WBing    Observations   Left Hip  Positive for incision       Additional Observation Details  Incision site demo good healing without sign of infection  Pt instructed in scar mobs for HEP    Lumbar Screen  Lumbar range of motion within normal limits  Active Range of Motion   Left Hip   Flexion: 112 degrees with pain  Extension: 20 degrees   Abduction: 40 degrees   External rotation (90/90): 80 degrees   Internal rotation (90/90): 20 degrees     Right Hip   Flexion: 127 degrees   Extension: 20 degrees   Abduction: 40 degrees   External rotation (90/90): 85 degrees   Internal rotation (90/90): 15 degrees   Left Knee   Normal active range of motion    Right Knee   Normal active range of motion  Left Ankle/Foot   Normal active range of motion    Right Ankle/Foot   Normal active range of motion    Passive Range of Motion   Left Hip   Flexion: 120 degrees with pain    Right Hip   Normal passive range of motion    Strength/Myotome Testing     Left Hip   Planes of Motion   Flexion: 4-  Extension: 4+  Abduction: 4  Adduction: 4+  External rotation: 4  Internal rotation: 4    Right Hip   Normal muscle strength    Left Knee   Flexion: 4  Extension: 4+    Right Knee   Normal strength    Left Ankle/Foot   Normal strength    Right Ankle/Foot   Normal strength    Tests     Left Hip   Modified Arsh: Positive  Ambulation     Ambulation: Stairs   Pattern: reciprocal  Railings: one rail  Pattern: reciprocal  Railings: one rail    Observational Gait   Gait: within functional limits   Decreased walking speed  Additional Observational Gait Details  Pt ambulates with a mostly normalized pattern other than slight decrease in heaven and decreased L hip ext during swing    Functional Assessment     Single Leg Stance   Left: 30 seconds  Right: 30 seconds    Comments  Squat:  Pt is able to squat to approx 90 deg knee flexion demonstrating mild increase in lumbar flexion secondary to hip flexion restriction    Denies c/o      Flowsheet Rows      Most Recent Value   PT/OT G-Codes   Current Score  49   Projected Score  69   Assessment Type  Re-evaluation   G code set  Mobility: Walking & Moving Around   Mobility: Walking and Moving Around Current Status ()  CK   Mobility: Walking and Moving Around Goal Status ()  CJ          Precautions: L [facility]    Specialty Daily Treatment Diary       Manual 9/13/18 9/18/18 9/19/18 9/12/18   PROM All planes  All planes All planes  All planes   IASTM Quads/ITB/ adductors w/the stick Quads/ITB/ adductors w/the stick Quads/ITB/ adductors w/the stick  Quads/ITB/ adductors w/the stick   Joint mobs        Man Flexibility HS/Rectus fem/piriformis HS/Rectus fem/piriformis HS/piriformis  HS/Rectus fem                   Exercise Diary         Eliptical 5min fwd/retro ea L7 5min fwd/retro ea L7   Eliptical 5min fwd/retro ea   Supine clamshells Short bridge 20x5" red w/abd Short bridge 20x5" red w/abd s/l 20x5" red  s/l 20x5" red   Ball squeeze   w/LTR 20x5" serge  10x5"/10x5" w/hip flex   Prone quad stretch 5x20" 5x20" 3x30"  5x20"   Bridges On PB 10 w/alt SLR x10 serge and bridge and curl x10 On PB 10 w/alt SLR x10 serge and bridge and curl x10 On PB w/alt SLR x10 serge and bridge and curl x10  SKTC 20x10"   Wall squats 20x5" on PB 2x10 on PB 30x5" on PB w/band abd red  Table 2x10   PPU  2x10 2x10 2x10  2x10   [place] 3 way hip  x15ea 2 0  x15ea 2 0 SLR 2x10/ Seated knee ext/hip flex 2x10  Seated knee/hip ext 2x10/standing 2x10 5 lbs   Lat band walks  6x25ft red 6x25ft red w/squat   6x25ft red   Step ups 2x10 12in 2x10 14 in Lat step down 2x10 serge 8in  Lat step down 2x10 serge 8in   BOSU lateral step overs 2x20 serge 2x20 serge      Rev Lunges w/sliders x10 serge  Prone hip ext 3x10 2lbs     Supine hip flexor stretch  3x30" 3x30"     Hip wall stabilization   45 deg 5x5"/90 deg 5x5"/ 90 deg w/ rot 5x5"     s/l hip abd/add   2x10 serge PB     Prayer stretch   20"x5             Modalities

## 2018-09-20 ENCOUNTER — OFFICE VISIT (OUTPATIENT)
Dept: PHYSICAL THERAPY | Facility: CLINIC | Age: 48
End: 2018-09-20
Payer: COMMERCIAL

## 2018-09-20 DIAGNOSIS — Z96.642 STATUS POST TOTAL HIP REPLACEMENT, LEFT: Primary | ICD-10-CM

## 2018-09-20 PROCEDURE — 97140 MANUAL THERAPY 1/> REGIONS: CPT

## 2018-09-20 PROCEDURE — 97110 THERAPEUTIC EXERCISES: CPT

## 2018-09-20 PROCEDURE — 97112 NEUROMUSCULAR REEDUCATION: CPT

## 2018-09-20 NOTE — PROGRESS NOTES
PT Re-Evaluation     Today's date: 2018  Patient name: Murtaza Robbins  : 1970  MRN: [id number]  Referring provider: Chhaya Sesay MD  Dx:   No diagnosis found  Assessment  Impairments: abnormal or restricted ROM, activity intolerance, impaired physical strength, lacks appropriate home exercise program, pain with function and poor body mechanics  Functional limitations: Squatting, prolonged sitting, don/doffing socks/shoes  Assessment details: To date, Edilma Tena has received 9 Physical Therapy visits consisting of manual therapy techniques, neuromuscular re-education and therapeutic exercises for Status post total hip replacement, left  (primary encounter diagnosis)  Patient demonstrates decreased pain, increased strength, increased ROM, increased joint mobility, decreased joint effusion, improved body mechanics, improved gait mechanics , improved posture  and increased activity tolerance and has been able to return to ambulating for exercise without AD and indep with all functional activity including don/doffing socks and shoes with minimal difficulty   Edilma Tena reports of continued pain/difficulty with prolonged sitting, especially on lower surfaces and deep squatting/lifting  She has not been able to return to premorbid exercise regimen at this time  Patient would benefit from continued skilled Physical Therapy services to progress towards prior level of function and independence with home exercise program   See updated goals below  Understanding of Dx/Px/POC: good   Prognosis: good    Goals  Short Term Goals: Target Date 18  1  Initiate and advance HEP - achieved  2  Increase left hip AROM by at least 20 deg - achieved  3  Increase left LE MMT by at least 1/2 grade - achieved  4  Pt will be able to ambulate community distances without cane - achieved  5  Pt will be able to ascend stairs with a Reciprocal pattern with UE support  - achieved  6   Pt will be able to squat to approx 90 deg knee flexion using proper form with PT cuing - achieved    Long Term Goals: Target Date 10/17/18  1  Indep with HEP  2  Increase right hip AROM to WNL in all planes  3  Increase left LE MMT to at least 4+/5  4  Pt will be able to ambulate community distances without gross deviation  5  Pt will be able to negotiate stairs with a reciprocal pattern without UE support   6  Pt will be able to perform a full squat using proper form without PT cuing  7  Pt will be able to return to PLOF including LE resistance training and cycling without limitation             Plan  Patient would benefit from: skilled PT  Planned modality interventions: cryotherapy, electrical stimulation/Russian stimulation and thermotherapy: hydrocollator packs  Planned therapy interventions: joint mobilization, manual therapy, patient education, postural training, activity modification, abdominal trunk stabilization, body mechanics training, flexibility, functional ROM exercises, graded exercise, home exercise program, neuromuscular re-education, strengthening, stretching, therapeutic activities, therapeutic exercise, motor coordination training, muscle pump exercises, gait training, balance/weight bearing training and ADL training  Frequency: 2x week  Duration in weeks: 4  Plan of Care beginning date: 9/19/2018  Plan of Care expiration date: 10/17/2018  Treatment plan discussed with: patient        Subjective Evaluation    History of Present Illness  Mechanism of injury: Pt reports of an approx 4 year history of L hip pain that began with a  "clicking" sensation during ambulation  Over time the pain progressed, and she began experiencing sharp pain with hip IR  Saw an ortho who performed performed x-ray (OA, osteoporosis) and MRI  Pain has been managed conservatively with injections x4 over the past year and PRP injection with good relief    Pain continued to increase post injections, so she decided to undergo a L THR on 8/21/18 with Dr Hugo Pastrana  Pt received home PT for 3 visits before D/C, and states she has been ambulating 1 5 to 2 miles  2018: To date, pt has received 9 PT visits  Overall, Uzma Crouch reports of a 50% improvement in function since beginning PT  Pt demonstrates improved body mechanics with squatting and has been able to return to ambulating community distances without AD  [place] Willa reports of continued pain/difficulty with prolinged sitting, transfers from low surfaces and has been unable to return to premorbid exercise regimen  Pt would benefit from continued skilled PT services 2-3x/week x 4 weeks to progress towards PLOF and indep with HEP  Quality of life: good    Pain  Current pain ratin  At best pain ratin  At worst pain rating: 3  Location: L Ant thigh (Occasional L piriformis/"nerve pain")  Quality: dull ache and pressure  Relieving factors: ice and rest  Aggravating factors: sitting (Prolonged sitting, deep squatting)  Progression: improved    Social Support  Steps to enter house: yes (2)  Stairs in house: yes (14)   Lives in: multiple-level home      Diagnostic Tests  X-ray: abnormal  MRI studies: abnormal  Treatments  Previous treatment: physical therapy  Current treatment: physical therapy  Patient Goals  Patient goals for therapy: decreased pain, improved balance, increased motion and return to sport/leisure activities  Patient goal: Return to cycling, LE resistance training and walking for exercise (Partially achieved, walking up to 4 miles )        Objective     Static Posture   General Observations  Asymmetrical weight bearing  Comments  Pt presents with normal posture, will increase R LE WBing with prolonged standing and requires occasional cuing to stand equal WBing    Observations   Left Hip  Positive for incision  Additional Observation Details  Incision site demo good healing without sign of infection    Pt instructed in scar mobs for HEP    Lumbar Screen  Lumbar range of motion within normal limits  Active Range of Motion   Left Hip   Flexion: 112 degrees with pain  Extension: 20 degrees   Abduction: 40 degrees   External rotation (90/90): 80 degrees   Internal rotation (90/90): 20 degrees     Right Hip   Flexion: 127 degrees   Extension: 20 degrees   Abduction: 40 degrees   External rotation (90/90): 85 degrees   Internal rotation (90/90): 15 degrees   Left Knee   Normal active range of motion    Right Knee   Normal active range of motion  Left Ankle/Foot   Normal active range of motion    Right Ankle/Foot   Normal active range of motion    Passive Range of Motion   Left Hip   Flexion: 120 degrees with pain    Right Hip   Normal passive range of motion    Strength/Myotome Testing     Left Hip   Planes of Motion   Flexion: 4-  Extension: 4+  Abduction: 4  Adduction: 4+  External rotation: 4  Internal rotation: 4    Right Hip   Normal muscle strength    Left Knee   Flexion: 4  Extension: 4+    Right Knee   Normal strength    Left Ankle/Foot   Normal strength    Right Ankle/Foot   Normal strength    Tests     Left Hip   Modified Arsh: Positive  Ambulation     Ambulation: Stairs   Pattern: reciprocal  Railings: one rail  Pattern: reciprocal  Railings: one rail    Observational Gait   Gait: within functional limits   Decreased walking speed  Additional Observational Gait Details  Pt ambulates with a mostly normalized pattern other than slight decrease in heaven and decreased L hip ext during swing    Functional Assessment     Single Leg Stance   Left: 30 seconds  Right: 30 seconds    Comments  Squat:  Pt is able to squat to approx 90 deg knee flexion demonstrating mild increase in lumbar flexion secondary to hip flexion restriction    Denies c/o          Precautions: L Ant [facility]    Specialty Daily Treatment Diary       Manual 9/13/18 9/18/18 9/19/18 9/12/18   PROM All planes  All planes All planes  All planes   IASTM Quads/ITB/ adductors w/the stick Quads/ITB/ adductors w/the stick Quads/ITB/ adductors w/the stick  Quads/ITB/ adductors w/the stick   Joint mobs        Man Flexibility HS/Rectus fem/piriformis HS/Rectus fem/piriformis HS/piriformis  HS/Rectus fem                   Exercise Diary         Eliptical 5min fwd/retro ea L7 5min fwd/retro ea L7   Eliptical 5min fwd/retro ea   Supine clamshells Short bridge 20x5" red w/abd Short bridge 20x5" red w/abd s/l 20x5" red  s/l 20x5" red   Ball squeeze   w/LTR 20x5" serge  10x5"/10x5" w/hip flex   Prone quad stretch 5x20" 5x20" 3x30"  5x20"   Bridges On PB 10 w/alt SLR x10 serge and bridge and curl x10 On PB 10 w/alt SLR x10 serge and bridge and curl x10 On PB w/alt SLR x10 serge and bridge and curl x10  SKTC 20x10"   Wall squats 20x5" on PB 2x10 on PB 30x5" on PB w/band abd red  Table 2x10   PPU  2x10 2x10 2x10  2x10   [place] 3 way hip  x15ea 2 0  x15ea 2 0 SLR 2x10/ Seated knee ext/hip flex 2x10  Seated knee/hip ext 2x10/standing 2x10 5 lbs   Lat band walks  6x25ft red 6x25ft red w/squat   6x25ft red   Step ups 2x10 12in 2x10 14 in Lat step down 2x10 serge 8in  Lat step down 2x10 serge 8in   BOSU lateral step overs 2x20 serge 2x20 serge      Rev Lunges w/sliders x10 serge  Prone hip ext 3x10 2lbs     Supine hip flexor stretch  3x30" 3x30"     Hip wall stabilization   45 deg 5x5"/90 deg 5x5"/ 90 deg w/ rot 5x5"     s/l hip abd/add   2x10 serge PB     Prayer stretch   20"x5             Modalities
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
## 2018-09-25 ENCOUNTER — OFFICE VISIT (OUTPATIENT)
Dept: PHYSICAL THERAPY | Facility: CLINIC | Age: 48
End: 2018-09-25
Payer: COMMERCIAL

## 2018-09-25 DIAGNOSIS — Z96.642 STATUS POST TOTAL HIP REPLACEMENT, LEFT: Primary | ICD-10-CM

## 2018-09-25 PROCEDURE — 97110 THERAPEUTIC EXERCISES: CPT

## 2018-09-25 PROCEDURE — 97112 NEUROMUSCULAR REEDUCATION: CPT

## 2018-09-25 PROCEDURE — 97140 MANUAL THERAPY 1/> REGIONS: CPT

## 2018-09-25 NOTE — PROGRESS NOTES
Daily Note     Today's date: 2018  Patient name: Reece Mena  : 1970  MRN: [id number]  Referring provider: Marie Jacobson MD  Dx:   Encounter Diagnosis   Name Primary?  Status post total hip replacement, left Yes       Start Time: 845  Stop Time: 935  Total time in clinic (min): 50 minutes    Subjective: Pt reports of mild soreness over the past few days as she has stopped taking the celebrex daily    Pain Ratin/10    Objective: See treatment diary below  Precautions: L Ant [facility]    Specialty Daily Treatment Diary     Manual 18   PROM All planes  All planes All planes  All planes   IASTM Quads/ITB/ adductors w/the stick Quads/ITB/ adductors w/the stick Quads/ITB/ adductors w/the stick  Quads/ITB/ adductors w/the stick   Joint mobs        Man Flexibility HS/Rectus fem/piriformis HS/Rectus fem/piriformis HS/piriformis  HS/rectus fem/piriformis                   Exercise Diary         Eliptical 5min fwd/retro ea L7 5min fwd/retro ea L7   5min fwd/retro ea L7   Supine clamshells Short bridge 20x5" red w/abd Short bridge 20x5" red w/abd s/l 20x5" red  Short bridge 20x5" red w/abd   Ball squeeze   w/LTR 20x5" serge  w/LTR 20x5" serge   Prone quad stretch  5x20" 3x30"     Bridges On PB 10 w/alt SLR x10 serge and bridge and curl x10 On PB 10 w/alt SLR x10 serge and bridge and curl x10 On PB w/alt SLR x10 serge and bridge and curl x10  On PB w/alt SLR x10 serge and bridge and curl x10   Wall squats 20x5" on PB 2x10 on PB 30x5" on PB w/band abd red  30x5" on PB w/band abd red   PPU  2x10 2x10 2x10  2x10   [place] 3 way hip  x15ea 2 0  x15ea 2 0 SLR 2x10/ Seated knee ext/hip flex 2x10  2x10ea 2 0/seated hip flexion 2x10 2lbs   Lat band walks  6x25ft red 6x25ft red w/squat      Step ups 2x10 12in 2x10 14 in Lat step down 2x10 serge 8in  Lat step down 2x10 serge 8in   BOSU lateral step overs 2x20 serge 2x20 serge      Rev Lunges w/sliders x10 serge  Prone hip ext 3x10 2lbs     Supine hip flexor stretch  3x30" 3x30"     Hip wall stabilization   45 deg 5x5"/90 deg 5x5"/ 90 deg w/ rot 5x5"     s/l hip abd/add   2x10 serge PB     Prayer stretch   20"x5  5x20"   Couch Stretch     3x30"   Modalities                            Assessment: Pt reports soreness is improved with flexibility activity  States her sciatic pain is gone and sensation has returned to her posterolateral thigh  Plan: Continue per plan of care  Progress treatment as tolerated

## 2018-09-26 ENCOUNTER — OFFICE VISIT (OUTPATIENT)
Dept: PHYSICAL THERAPY | Facility: CLINIC | Age: 48
End: 2018-09-26
Payer: COMMERCIAL

## 2018-09-26 DIAGNOSIS — Z96.642 STATUS POST TOTAL HIP REPLACEMENT, LEFT: Primary | ICD-10-CM

## 2018-09-26 PROCEDURE — 97112 NEUROMUSCULAR REEDUCATION: CPT

## 2018-09-26 PROCEDURE — 97140 MANUAL THERAPY 1/> REGIONS: CPT

## 2018-09-26 PROCEDURE — 97110 THERAPEUTIC EXERCISES: CPT

## 2018-09-26 NOTE — PROGRESS NOTES
Daily Note     Today's date: 2018  Patient name: Carlin Essex  : 1970  MRN: [id number]  Referring provider: Finn Caba MD  Dx:   Encounter Diagnosis   Name Primary?  Status post total hip replacement, left Yes       Start Time: 1000  Stop Time: 1055  Total time in clinic (min): 55 minutes    Subjective: Pt reports she participated in spin class yesterday without increased c/o    Pain Ratin/10    Objective: See treatment diary below  Precautions: L Ant [facility]    Specialty Daily Treatment Diary     Manual 18   PROM All planes  All planes All planes All planes All planes   IASTM Quads/ITB/ adductors w/the stick Quads/ITB/ adductors w/the stick Quads/ITB/ adductors w/the stick Quads/ITB/ adductors w/the stick Quads/ITB/ adductors w/the stick   Joint mobs        Man Flexibility HS/Rectus fem/piriformis HS/Rectus fem/piriformis HS/piriformis HS/rectus fem/piriformis HS/piriformis                   Exercise Diary         Eliptical 5min fwd/retro ea L7 5min fwd/retro ea L7  5min fwd/retro ea L7    Supine clamshells Short bridge 20x5" red w/abd Short bridge 20x5" red w/abd s/l 20x5" red Short bridge 20x5" red w/abd S/l 15x5" red   Ball squeeze   w/LTR 20x5" serge w/LTR 20x5" serge    Prone quad stretch  5x20" 3x30"  3x30"   Bridges On PB 10 w/alt SLR x10 serge and bridge and curl x10 On PB 10 w/alt SLR x10 serge and bridge and curl x10 On PB w/alt SLR x10 serge and bridge and curl x10 On PB w/alt SLR x10 serge and bridge and curl x10 SL 20x5"   Wall squats 20x5" on PB 2x10 on PB 30x5" on PB w/band abd red 30x5" on PB w/band abd red 15x3" chair hover/SL eccentric x15 serge   PPU  2x10 2x10 2x10 2x10 2x10   [place] 3 way hip  x15ea 2 0  x15ea 2 0 SLR 2x10/ Seated knee ext/hip flex 2x10 SLR 2x10/ Seated knee ext/hip flex 2x10 2lbs SLR 3x20/ Flex 2x10 ea 2 5   Lat band walks  6x25ft red 6x25ft red w/squat   8x25ft green   Step ups 2x10 12in 2x10 14 in Lat step down 2x10 serge 8in Lat step down 2x10 serge 8in 2x10 14in   BOSU lateral step overs 2x20 serge 2x20 serge   x15 serge   Rev Lunges w/sliders x10 serge  Prone hip ext 3x10 2lbs  w/sliders 2x10 serge   Supine hip flexor stretch  3x30" 3x30"     Hip wall stabilization   45 deg 5x5"/90 deg 5x5"/ 90 deg w/ rot 5x5"     s/l hip abd/add   2x10 serge PB  20x3" serge PB   Prayer stretch   20"x5 5x20" 5x20"   Couch Stretch    3X30" 3x30"   Modalities                            Assessment: Pt demonstrates improved hip flexion A/PROM, but reports of continued soreness with end range strengthening and squatting  Plan: Continue per plan of care  Progress treatment as tolerated

## 2018-09-27 ENCOUNTER — OFFICE VISIT (OUTPATIENT)
Dept: PHYSICAL THERAPY | Facility: CLINIC | Age: 48
End: 2018-09-27
Payer: COMMERCIAL

## 2018-09-27 DIAGNOSIS — Z96.642 STATUS POST TOTAL HIP REPLACEMENT, LEFT: Primary | ICD-10-CM

## 2018-09-27 PROCEDURE — 97110 THERAPEUTIC EXERCISES: CPT

## 2018-09-27 PROCEDURE — 97140 MANUAL THERAPY 1/> REGIONS: CPT

## 2018-09-27 PROCEDURE — 97112 NEUROMUSCULAR REEDUCATION: CPT

## 2018-09-27 NOTE — PROGRESS NOTES
Daily Note     Today's date: 2018  Patient name: Oliver Taveras  : 1970  MRN: [id number]  Referring provider: Derek Begum MD  Dx:   Encounter Diagnosis   Name Primary?  Status post total hip replacement, left Yes       Start Time: 845  Stop Time: 945  Total time in clinic (min): 60 minutes    Subjective: Pt reports she is sore from resisted hip flexion yesterday  Felt better after her daily walk    Pain Ratin/10    Objective: See treatment diary below  Precautions: L Ant [facility]    Specialty Daily Treatment Diary     Manual 18   PROM All planes   All planes All planes All planes   IASTM Quads/ITB/ adductors w/the stick  Quads/ITB/ adductors w/the stick Quads/ITB/ adductors w/the stick Quads/ITB/ adductors w/the stick   Joint mobs        Man Flexibility HS/Rectus fem/piriformis  HS/piriformis HS/rectus fem/piriformis HS/piriformis                   Exercise Diary         Eliptical 5min fwd/retro ea L7   5min fwd/retro ea L7    Supine clamshells   s/l 20x5" red Short bridge 20x5" red w/abd S/l 15x5" red   Ball squeeze 20x5" feet elevated  w/LTR 20x5" serge w/LTR 20x5" serge    Prone quad stretch   3x30"  3x30"   Bridges 30x5"  On PB w/alt SLR x10 serge and bridge and curl x10 On PB w/alt SLR x10 serge and bridge and curl x10 SL 20x5"   Wall squats 30x5" on PB w/band abd red  30x5" on PB w/band abd red 30x5" on PB w/band abd red 15x3" chair hover/SL eccentric x15 serge   PPU  2x10  2x10 2x10 2x10   [place] 3 way hip  x15ea 1 5  SLR 2x10/ Seated knee ext/hip flex 2x10 SLR 2x10/ Seated knee ext/hip flex 2x10 2lbs SLR 3x20/ Flex 2x10 ea 2 5   Lat band walks      8x25ft green   Step ups 2x10 8 in  Lat step down 2x10 serge 8in Lat step down 2x10 serge 8in 2x10 14in   BOSU lateral step overs     x15 serge   Rev Lunges   Prone hip ext 3x10 2lbs  w/sliders 2x10 serge   Supine hip flexor stretch 3x30"  3x30"     Hip wall stabilization 45 deg 5x5"/90 deg 5x5"  45 deg 5x5"/90 deg 5x5"/ 90 deg w/ rot 5x5"     s/l hip abd/add   2x10 serge PB  20x3" serge PB   Prayer stretch x20"  20"x5 5x20" 5x20"   Couch Stretch 3x30"   3X30" 3x30"   Prostretch 5x20"       Modalities                            Assessment: Pt reports of decreased soreness and improved mobility at EOS  Advised to progress with hip flexor stretching, and to rest for a day over the weekend as she has greatly increased her activity level  Plan: Continue per plan of care  Progress treatment as tolerated

## 2018-10-02 ENCOUNTER — OFFICE VISIT (OUTPATIENT)
Dept: PHYSICAL THERAPY | Facility: CLINIC | Age: 48
End: 2018-10-02
Payer: COMMERCIAL

## 2018-10-02 DIAGNOSIS — Z96.642 STATUS POST TOTAL HIP REPLACEMENT, LEFT: Primary | ICD-10-CM

## 2018-10-02 PROCEDURE — 97112 NEUROMUSCULAR REEDUCATION: CPT

## 2018-10-02 PROCEDURE — 97110 THERAPEUTIC EXERCISES: CPT

## 2018-10-02 PROCEDURE — 97140 MANUAL THERAPY 1/> REGIONS: CPT

## 2018-10-02 NOTE — PROGRESS NOTES
Daily Note     Today's date: 10/2/2018  Patient name: Chance Lopez  : 1970  MRN: [id number]  Referring provider: Bert Sweeney MD  Dx:   Encounter Diagnosis   Name Primary?  Status post total hip replacement, left Yes       Start Time: 845  Stop Time: 945  Total time in clinic (min): 60 minutes    Subjective: Pt reports of a mild twinge this morning with quick rotation  Otherwise denies c/o    Pain Ratin/10    Objective: See treatment diary below  Precautions: L Ant [facility]    Specialty Daily Treatment Diary     Manual 9/27/18 10/2/18  9/25/18 9/26/18   PROM All planes  All planes  All planes All planes   IASTM Quads/ITB/ adductors w/the stick Quads/ITB/ adductors w/the stick  Quads/ITB/ adductors w/the stick Quads/ITB/ adductors w/the stick   Joint mobs        Man Flexibility HS/Rectus fem/piriformis HS/recus fem/piriformis  HS/rectus fem/piriformis HS/piriformis                   Exercise Diary         Eliptical 5min fwd/retro ea L7 5min fwd/retro ea L7  5min fwd/retro ea L7    Supine clamshells  Short bridge 20x5" red w/abd  Short bridge 20x5" red w/abd S/l 15x5" red   Ball squeeze 20x5" feet elevated   w/LTR 20x5" serge    Prone quad stretch     3x30"   Bridges 30x5" SL x15/On PB w/alt SLR x15 serge and bridge and curl x15  On PB w/alt SLR x10 serge and bridge and curl x10 SL 20x5"   Wall squats 30x5" on PB w/band abd red 20x5" on PB  30x5" on PB w/band abd red 15x3" chair hover/SL eccentric x15 serge   PPU  2x10 2x10  2x10 2x10   [place] 3 way hip  x15ea 1 5 2x10 ea 2 0  SLR 2x10/ Seated knee ext/hip flex 2x10 2lbs SLR 3x20/ Flex 2x10 ea 2 5   Lat band walks   8x25ft blue   8x25ft green   Step ups 2x10 8 in Lat step down x15 serge 8in  Lat step down 2x10 serge 8in 2x10 14in   BOSU lateral step overs  x15 fwd/lat   x15 serge   Rev Lunges  w/sliders x10 serge   w/sliders 2x10 serge   Supine hip flexor stretch 3x30" 3x30"      Hip wall stabilization 45 deg 5x5"/90 deg 5x5"       s/l hip abd/add     20x3" serge PB Prayer stretch x20" 20x5"  5x20" 5x20"   Deepti Farrow 3x30"   3X30" 3x30"   Prostretch 5x20" 5x20"      SLR  2x15/seated hip flex 20x5" 2lbs      Prone hip IR        Modalities                            Assessment: Pt reports decreased soreness at EOS  Improved hip flexion PROM, remains weak at end range  Improved squat form and depth without c/o  Plan: Continue per plan of care  Progress treatment as tolerated

## 2018-10-03 ENCOUNTER — APPOINTMENT (OUTPATIENT)
Dept: PHYSICAL THERAPY | Facility: CLINIC | Age: 48
End: 2018-10-03
Payer: COMMERCIAL

## 2018-10-04 ENCOUNTER — OFFICE VISIT (OUTPATIENT)
Dept: PHYSICAL THERAPY | Facility: CLINIC | Age: 48
End: 2018-10-04
Payer: COMMERCIAL

## 2018-10-04 DIAGNOSIS — Z96.642 STATUS POST TOTAL HIP REPLACEMENT, LEFT: Primary | ICD-10-CM

## 2018-10-04 PROCEDURE — 97140 MANUAL THERAPY 1/> REGIONS: CPT

## 2018-10-04 PROCEDURE — 97110 THERAPEUTIC EXERCISES: CPT

## 2018-10-04 PROCEDURE — 97112 NEUROMUSCULAR REEDUCATION: CPT

## 2018-10-04 NOTE — PROGRESS NOTES
Daily Note     Today's date: 10/4/2018  Patient name: Riaz Buck  : 1970  MRN: [id number]  Referring provider: Carloz Geronimo MD  Dx:   Encounter Diagnosis   Name Primary?  Status post total hip replacement, left Yes       Start Time: 930  Stop Time: 102  Total time in clinic (min): 55 minutes    Subjective: Pt reports she performed her TRX class and has muscle soreness but states her hip feels good  Walked 4 5 miles this morning and states her pace is increasing    Pain Ratin/10    Objective: See treatment diary below  Precautions: L Ant [facility]    Specialty Daily Treatment Diary     Manual 9/27/18 10/2/18 10/4/18  9/26/18   PROM All planes  All planes All planes  All planes   IASTM Quads/ITB/ adductors w/the stick Quads/ITB/ adductors w/the stick Quads/ITB/ adductors w/the stick  Quads/ITB/ adductors w/the stick   Joint mobs        Man Flexibility HS/Rectus fem/piriformis HS/recus fem/piriformis HS/recus fem/piriformis  HS/piriformis                   Exercise Diary         Eliptical 5min fwd/retro ea L7 5min fwd/retro ea L7 5min fwd/retro ea L7     Supine clamshells  Short bridge 20x5" red w/abd s/l 20x5" red  S/l 15x5" red   Ball squeeze 20x5" feet elevated  w/LTR 20x5" serge     Piriformis stretch   5x20"  3x30"   Bridges 30x5" SL x15/On PB w/alt SLR x15 serge and bridge and curl x15   SL 20x5"   Wall squats 30x5" on PB w/band abd red 20x5" on PB 15x3" chair hover/SL eccentric x15 serge  l   PPU  2x10 2x10 2x10  2x10   [place] 3 way hip  x15ea 1 5 2x10 ea 2 0 2x10ea 2 5  SLR 3x20/ Flex 2x10 ea 2 5   Lat band walks   8x25ft blue   8x25ft green   Step ups 2x10 8 in Lat step down x15 serge 8in 2x10 14in     BOSU lateral step overs  x15 fwd/lat   x15 serge   Rev Lunges  w/sliders x10 serge SL RDL 2x10 serge 5 0  w/sliders 2x10 serge   Supine hip flexor stretch 3x30" 3x30"      Hip wall stabilization 45 deg 5x5"/90 deg 5x5"  45 deg 5x5"/90 deg 5x5"     s/l hip abd/add     20x3" serge PB   Prayer stretch x20" 20x5" 5x20"  5x20"   Orren Martinet 3x30"  3x30"  3x30"   Prostretch 5x20" 5x20" 5x20"     SLR  2x15/seated hip flex 20x5" 2lbs 2x20/Seated hip flex 20x5" 3 lbs     Prone hip IR   2x10 red     Modalities                              Assessment: Pt challenged with glute max/med stability with SL RDL  Mild sorenes with end range hip flexion and ER  Progressing well with squatting form, denies c/o  Plan: Continue per plan of care  Progress treatment as tolerated

## 2018-10-09 ENCOUNTER — OFFICE VISIT (OUTPATIENT)
Dept: PHYSICAL THERAPY | Facility: CLINIC | Age: 48
End: 2018-10-09
Payer: COMMERCIAL

## 2018-10-09 DIAGNOSIS — Z96.642 STATUS POST TOTAL HIP REPLACEMENT, LEFT: Primary | ICD-10-CM

## 2018-10-09 PROCEDURE — 97110 THERAPEUTIC EXERCISES: CPT

## 2018-10-09 PROCEDURE — 97112 NEUROMUSCULAR REEDUCATION: CPT

## 2018-10-09 PROCEDURE — 97140 MANUAL THERAPY 1/> REGIONS: CPT

## 2018-10-09 NOTE — PROGRESS NOTES
Daily Note     Today's date: 10/9/2018  Patient name: Annia Valdez  : 1970  MRN: [id number]  Referring provider: Neo Hinds MD  Dx:   Encounter Diagnosis   Name Primary?  Status post total hip replacement, left Yes       Start Time: 930  Stop Time: 103  Total time in clinic (min): 60 minutes    Subjective: Pt reports "I had 3 accidents this weekend," including slipping in the bathroom, getting her foot caught in her , and catching her father who was falling  Reports of mild soreness post, but felt better with ice and was able to walk 4 miles this morning    Pain Rating: 3/10    Objective: See treatment diary below  Precautions: L Ant [facility]    Specialty Daily Treatment Diary     Manual 9/27/18 10/2/18 10/4/18 10/9/18    PROM All planes  All planes All planes All planes    IASTM Quads/ITB/ adductors w/the stick Quads/ITB/ adductors w/the stick Quads/ITB/ adductors w/the stick Quads/ITB/ adductors w/the stick    Joint mobs        Man Flexibility HS/Rectus fem/piriformis HS/recus fem/piriformis HS/recus fem/piriformis HS/recus fem/piriformis                    Exercise Diary         Eliptical 5min fwd/retro ea L7 5min fwd/retro ea L7 5min fwd/retro ea L7 5min fwd/retro ea L7    Supine clamshells  Short bridge 20x5" red w/abd s/l 20x5" red s/l 20x5" green    Ball squeeze 20x5" feet elevated  w/LTR 20x5" serge     Piriformis stretch   5x20"     Bridges 30x5" SL x15/On PB w/alt SLR x15 serge and bridge and curl x15  On PB w/alt SLR 2x10 serge    Wall squats 30x5" on PB w/band abd red 20x5" on PB 15x3" chair hover/SL eccentric x15 serge 20x5"    PPU  2x10 2x10 2x10 2x10    [place] 3 way hip  x15ea 1 5 2x10 ea 2 0 2x10ea 2 5     Lat band walks   8x25ft blue      Step ups 2x10 8 in Lat step down x15 serge 8in 2x10 14in     BOSU lateral step overs  x15 fwd/lat  SL RDL 2x10 serge 5 0    Rev Lunges  w/sliders x10 serge SL RDL 2x10 serge 5 0 w/slides x10 serge    Supine hip flexor stretch 3x30" 3x30"  3x30" serge    Hip wall stabilization 45 deg 5x5"/90 deg 5x5"  45 deg 5x5"/90 deg 5x5" LE cone taps 2x5    s/l hip abd/add        Prayer stretch x20" 20x5" 5x20" 5x20"    Couch Stretch 3x30"  3x30" 3x30" serge    Prostretch 5x20" 5x20" 5x20" 5x20"    SLR  2x15/seated hip flex 20x5" 2lbs 2x20/Seated hip flex 20x5" 3 lbs Seated hip flexion 20x5"    Prone hip IR   2x10 red 2x10 red    Modalities                            Assessment: Pt reports of tightness with flexibility but denies increased soreness  Demo improved SL dynamic stability with RDL and cone taps  Seeing her MD for f/u on Thurs 10/11  Plan: Continue per plan of care  Progress treatment as tolerated

## 2018-10-10 ENCOUNTER — APPOINTMENT (OUTPATIENT)
Dept: PHYSICAL THERAPY | Facility: CLINIC | Age: 48
End: 2018-10-10
Payer: COMMERCIAL

## 2018-10-11 ENCOUNTER — APPOINTMENT (OUTPATIENT)
Dept: PHYSICAL THERAPY | Facility: CLINIC | Age: 48
End: 2018-10-11
Payer: COMMERCIAL

## 2018-10-16 ENCOUNTER — OFFICE VISIT (OUTPATIENT)
Dept: PHYSICAL THERAPY | Facility: CLINIC | Age: 48
End: 2018-10-16
Payer: COMMERCIAL

## 2018-10-16 DIAGNOSIS — Z96.642 STATUS POST TOTAL HIP REPLACEMENT, LEFT: Primary | ICD-10-CM

## 2018-10-16 PROCEDURE — 97110 THERAPEUTIC EXERCISES: CPT

## 2018-10-16 PROCEDURE — 97140 MANUAL THERAPY 1/> REGIONS: CPT

## 2018-10-16 PROCEDURE — 97112 NEUROMUSCULAR REEDUCATION: CPT

## 2018-10-16 NOTE — PROGRESS NOTES
Daily Note     Today's date: 10/16/2018  Patient name: Nirmal Hernández  : 1970  MRN: [id number]  Referring provider: Linda Breen MD  Dx:   Encounter Diagnosis   Name Primary?  Status post total hip replacement, left Yes       Start Time: 1015  Stop Time: 1115  Total time in clinic (min): 60 minutes    Subjective: Pt reports of seeing MD for f/u visit  She remains unable to travel for work or perform impact activity for at least another 6 weeks    Pain Ratin/10    Objective: See treatment diary below  Precautions: L Ant [facility]    Specialty Daily Treatment Diary     Manual 9/27/18 10/2/18 10/4/18 10/9/18 10/16/18   PROM All planes  All planes All planes All planes All planes   IASTM Quads/ITB/ adductors w/the stick Quads/ITB/ adductors w/the stick Quads/ITB/ adductors w/the stick Quads/ITB/ adductors w/the stick Quads/ITB/ adductors w/the stick   Joint mobs        Man Flexibility HS/Rectus fem/piriformis HS/recus fem/piriformis HS/recus fem/piriformis HS/recus fem/piriformis HS/recus fem/piriformis                   Exercise Diary         Eliptical 5min fwd/retro ea L7 5min fwd/retro ea L7 5min fwd/retro ea L7 5min fwd/retro ea L7 5min fwd/retro ea L7   Supine clamshells  Short bridge 20x5" red w/abd s/l 20x5" red s/l 20x5" green s/l 20x5" green   Ball squeeze 20x5" feet elevated  w/LTR 20x5" serge     Piriformis stretch   5x20"  5x20"   Bridges 30x5" SL x15/On PB w/alt SLR x15 serge and bridge and curl x15  On PB w/alt SLR 2x10 serge    Wall squats 30x5" on PB w/band abd red 20x5" on PB 15x3" chair hover/SL eccentric x15 serge 20x5"    PPU  2x10 2x10 2x10 2x10 2x10   [place] 3 way hip  x15ea 1 5 2x10 ea 2 0 2x10ea 2 5     Lat band walks   8x25ft blue   Max walk-outs x5 ea 8 0   Step ups 2x10 8 in Lat step down x15 serge 8in 2x10 14in     BOSU lateral step overs  x15 fwd/lat  SL RDL 2x10 serge 5 0 DB RDL to overhead press 2x10 serge 5lbs   Rev Lunges  w/sliders x10 serge SL RDL 2x10 serge 5 0 w/slides x10 serge Supine hip flexor stretch 3x30" 3x30"  3x30" serge    Hip wall stabilization 45 deg 5x5"/90 deg 5x5"  45 deg 5x5"/90 deg 5x5" LE cone taps 2x5 LE cone taps 2x5   s/l hip abd/add        Prayer stretch x20" 20x5" 5x20" 5x20" 5x20"   Couch Stretch 3x30"  3x30" 3x30" serge 3x30" serge   Prostretch 5x20" 5x20" 5x20" 5x20" 5x20"   SLR  2x15/seated hip flex 20x5" 2lbs 2x20/Seated hip flex 20x5" 3 lbs Seated hip flexion 20x5"    Prone hip IR   2x10 red 2x10 red 2x10 green   Modalities                            Assessment: Pt reports of muscle soreness serge so strengthening activity was limited  Continued difficulty with SL dynamic stability  No longer reports of pinching pain with hip flexion  Re-eval upon next session  Plan: Continue per plan of care  Progress treatment as tolerated

## 2018-10-17 ENCOUNTER — APPOINTMENT (OUTPATIENT)
Dept: PHYSICAL THERAPY | Facility: CLINIC | Age: 48
End: 2018-10-17
Payer: COMMERCIAL

## 2018-10-18 ENCOUNTER — APPOINTMENT (OUTPATIENT)
Dept: PHYSICAL THERAPY | Facility: CLINIC | Age: 48
End: 2018-10-18
Payer: COMMERCIAL

## 2018-10-23 ENCOUNTER — OFFICE VISIT (OUTPATIENT)
Dept: PHYSICAL THERAPY | Facility: CLINIC | Age: 48
End: 2018-10-23
Payer: COMMERCIAL

## 2018-10-23 DIAGNOSIS — Z96.642 STATUS POST TOTAL HIP REPLACEMENT, LEFT: Primary | ICD-10-CM

## 2018-10-23 PROCEDURE — 97140 MANUAL THERAPY 1/> REGIONS: CPT

## 2018-10-23 PROCEDURE — G8978 MOBILITY CURRENT STATUS: HCPCS

## 2018-10-23 PROCEDURE — 97112 NEUROMUSCULAR REEDUCATION: CPT

## 2018-10-23 PROCEDURE — G8979 MOBILITY GOAL STATUS: HCPCS

## 2018-10-23 PROCEDURE — 97110 THERAPEUTIC EXERCISES: CPT

## 2018-10-23 NOTE — LETTER
2018    Víctor Bazan MD  [facility] [id number]    Patient: Ramya Pretty   YOB: 1970   Date of Visit: 10/23/2018     Encounter Diagnosis     ICD-10-CM    1  Status post total hip replacement, left G05 [id number]        Dear Dr Rio Ballard:    Please review the attached Plan of Care from [facility] recent visit  Please verify that you agree therapy should continue by signing the attached document and sending it back to our office  If you have any questions or concerns, please don't hesitate to call  Sincerely,    Christopher Regalado PT      Referring Provider:      I certify that I have read the below Plan of Care and certify the need for these services furnished under this plan of treatment while under my care  Víctor Bazan MD  [facility] [id number]  VIA Facsimile: [phone]          PT Re-Evaluation     Today's date: 10/23/2018  Patient name: Ramya Pretty  : 1970  MRN: [id number]  Referring provider: Michael Bowens MD  Dx:   Encounter Diagnosis     ICD-10-CM    1  Status post total hip replacement, left [id number]        Start Time: 920  Stop Time: 1000  Total time in clinic (min): 40 minutes    Assessment  Impairments: activity intolerance, impaired physical strength, lacks appropriate home exercise program and poor body mechanics  Functional limitations: Prolonged sitting, squatting, traveling for work, premorbid exercise regimen  Assessment details: To date, Gaurav Arizmendi has received 18 Physical Therapy visits consisting of manual therapy techniques, neuromuscular re-education and therapeutic exercises for Status post total hip replacement, left  (primary encounter diagnosis)    Patient demonstrates decreased pain, increased strength, increased ROM, increased joint mobility, decreased joint effusion, improved body mechanics, improved gait mechanics , improved posture  and increased activity tolerance and has been able to return to community ambulation without gross abnormality and perform ADL's without pain or limitation  Mohamud Echeverria reports of continued pain/difficulty with prolonged sitting (>1 hour) and deep squatting  She 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  Patient would benefit from continued skilled Physical Therapy services to progress towards prior level of function and independence with home exercise program   See updated goals below  Understanding of Dx/Px/POC: good   Prognosis: good    Goals  Short Term Goals: Target Date 09/20/18  1  Initiate and advance HEP - achieved  2  Increase left hip AROM by at least 20 deg - achieved  3  Increase left LE MMT by at least 1/2 grade - achieved  4  Pt will be able to ambulate community distances without cane - achieved  5  Pt will be able to ascend stairs with a Reciprocal pattern with UE support  - achieved  6  Pt will be able to squat to approx 90 deg knee flexion using proper form with PT cuing - achieved    Long Term Goals: Target Date 10/17/18  1  Indep with HEP - progressing towards  2  Increase right hip AROM to WNL in all planes - achieved  3  Increase left LE MMT to at least 4+/5 - mostly achieved  4  Pt will be able to ambulate community distances without gross deviation - achieved  5  Pt will be able to negotiate stairs with a reciprocal pattern without UE support  - achieved  6  Pt will be able to perform a full squat using proper form without PT cuing - progressing towards  7  Pt will be able to return to PLOF including LE resistance training and cycling without limitation - progressing towards    New Long Term Goals: Target Date 11/20/18  1  Indep with finalized HEP  2  Increase sitting tolerance to >90 mins  3  Pt will be able to return to full duty/traveling for work per MD orders  4   Pt will be able to return to premorbid exercise progressing per MD orders          Plan  Patient would benefit from: skilled PT  Planned modality interventions: cryotherapy, electrical stimulation/Russian stimulation and thermotherapy: hydrocollator packs  Planned therapy interventions: joint mobilization, manual therapy, patient education, postural training, activity modification, abdominal trunk stabilization, body mechanics training, flexibility, functional ROM exercises, graded exercise, home exercise program, neuromuscular re-education, strengthening, stretching, therapeutic activities, therapeutic exercise, motor coordination training, muscle pump exercises, gait training, balance/weight bearing training and ADL training  Frequency: 2x week  Duration in weeks: 6  Plan of Care beginning date: 10/23/2018  Plan of Care expiration date: 11/20/2018  Treatment plan discussed with: patient        Subjective Evaluation    History of Present Illness  Mechanism of injury: Pt reports of an approx 4 year history of L hip pain that began with a  "clicking" sensation during ambulation  Over time the pain progressed, and she began experiencing sharp pain with hip IR  Saw an ortho who performed performed x-ray (OA, osteoporosis) and MRI  Pain has been managed conservatively with injections x4 over the past year and PRP injection with good relief  Pain continued to increase post injections, so she decided to undergo a L THR on 8/21/18 with Dr Kathy Birmingham  Pt received home PT for 3 visits before D/C, and states she has been ambulating 1 5 to 2 miles  10/23/2018: To date, pt has received 18 PT visits  Overall, Gilmar Frederick reports of a 70% improvement in function since beginning PT  Pt demonstrates improved body mechanics with squatting and has been able to return to ambulating community distances without AD    Gilmar Frederick reports of continued pain/difficulty with prolinged sitting and deep squatting, and 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  Pt would benefit from continued skilled PT services 2x/week x 6 weeks to progress towards PLOF and indep with HEP  Quality of life: good    Pain  Current pain ratin  At best pain ratin  At worst pain ratin  Location: L ant hip/groin  Quality: dull ache  Relieving factors: ice and rest  Aggravating factors: sitting (Prolonged sitting, deep squatting)  Progression: improved    Social Support  Steps to enter house: yes (2)  Stairs in house: yes (14)   Lives in: multiple-level home      Diagnostic Tests  X-ray: abnormal  MRI studies: abnormal  Treatments  Previous treatment: physical therapy  Current treatment: physical therapy  Patient Goals  Patient goals for therapy: decreased pain, improved balance, increased motion and return to sport/leisure activities  Patient goal: Return to cycling, LE resistance training and walking for exercise (Progressing towards, remains unable to return to premorbid exercise  per MD orders)        Objective     Static Posture     Comments  Normal standing posture without c/o    Observations   Left Hip  Positive for incision  Lumbar Screen  Lumbar range of motion within normal limits      Active Range of Motion   Left Hip   Flexion: 120 degrees   Extension: 20 degrees   Abduction: 40 degrees   External rotation (90/90): 85 degrees   Internal rotation (90/90): 20 degrees     Right Hip   Flexion: 127 degrees   Extension: 20 degrees   Abduction: 40 degrees   External rotation (90/90): 85 degrees   Internal rotation (90/90): 15 degrees   Left Knee   Normal active range of motion    Right Knee   Normal active range of motion  Left Ankle/Foot   Normal active range of motion    Right Ankle/Foot   Normal active range of motion    Passive Range of Motion   Left Hip   Normal passive range of motion    Right Hip   Normal passive range of motion    Strength/Myotome Testing     Left Hip   Planes of Motion   Flexion: 4  Extension: 5  Abduction: 4  Adduction: 4+  External rotation: 4+  Internal rotation: 4+    Right Hip   Normal muscle strength    Left Knee   Flexion: 5  Extension: 5    Right Knee   Normal strength    Left Ankle/Foot   Normal strength    Right Ankle/Foot   Normal strength    Tests     Left Hip   Modified Arsh: Positive  Ambulation     Ambulation: Stairs   Pattern: reciprocal  Pattern: reciprocal    Observational Gait   Gait: within functional limits     Additional Observational Gait Details  Pt ambulates with a normalized pattern without gross c/o    Functional Assessment     Single Leg Squat   Left Leg  Sitting toward right side, valgus and anterior tibial translation beyond toes  Right Leg  Within functional limits  Single Leg Stance   Left: 30 seconds  Right: 30 seconds    Comments  Squat:  Pt is able to squat to approx 90 deg knee flexion with mild anterior knee translation serge and weight shift onto forefoot  Slight weight shift to R LE with increased depth  Denies c/o    Sitting/driving: Pt able to sit/drive approx 60 mins before needing to stand    Remains unable to drive for work by MD Audrey Plasencia      Most Recent Value   PT/OT G-Codes   Current Score  66   Projected Score  69   FOTO information reviewed  Yes   Assessment Type  Re-evaluation   G code set  Mobility: Walking & Moving Around   Mobility: Walking and Moving Around Current Status ()  CJ   Mobility: Walking and Moving Around Goal Status ()  CJ            Precautions: L Ant [facility]    Specialty Daily Treatment Diary     Manual 10/23/18  10/4/18 10/9/18 10/16/18   PROM All planes   All planes All planes All planes   IASTM Quads/ITB/ adductors w/the stick  Quads/ITB/ adductors w/the stick Quads/ITB/ adductors w/the stick Quads/ITB/ adductors w/the stick   Joint mobs        Man Flexibility HS/Rectus fem/piriformis  HS/recus fem/piriformis HS/recus fem/piriformis HS/recus fem/piriformis                   Exercise Diary         Eliptical   5min fwd/retro ea L7 5min fwd/retro ea L7 5min fwd/retro ea L7   Supine clamshells Short bridge 20x5" red w/abd  s/l 20x5" red s/l 20x5" green s/l 20x5" green   Ball squeeze   w/LTR 20x5" serge     Piriformis stretch 5x20"  5x20"  5x20"   Bridges SL on PB w/alt SLR x15 serge and bridge and curl x15   On PB w/alt SLR 2x10 serge    Wall squats BW squats 2x10  15x3" chair hover/SL eccentric x15 serge 20x5"    PPU  2x10  2x10 2x10 2x10   [place] 3 way hip   2x10ea 2 5     Lat band walks  6x25ft blue    [place] walk-outs x5 ea 8 0   Step ups   2x10 14in     BOSU lateral step overs    SL RDL 2x10 serge 5 0 DB RDL to overhead press 2x10 serge 5lbs   Rev Lunges   SL RDL 2x10 serge 5 0 w/slides x10 serge    Supine hip flexor stretch 3x30"   3x30" serge    Hip wall stabilization   45 deg 5x5"/90 deg 5x5" LE cone taps 2x5 LE cone taps 2x5   s/l hip abd/add        Prayer stretch x520"  5x20" 5x20" 5x20"   Couch Stretch   3x30" 3x30" serge 3x30" serge   Prostretch   5x20" 5x20" 5x20"   SLR 2x10  2x20/Seated hip flex 20x5" 3 lbs Seated hip flexion 20x5"    Prone hip IR 2x10 green  2x10 red 2x10 red 2x10 green   Modalities

## 2018-10-23 NOTE — PROGRESS NOTES
PT Discharge    Today's date: 2018  Patient name: Tilton Nissen  : 1970  MRN: [id number]  Referring provider: Alistair Jarquin MD  Dx:   Encounter Diagnosis     ICD-10-CM    1  Status post total hip replacement, left D59 167        Start Time: 920  Stop Time:   Total time in clinic (min): 40 minutes    Assessment  Assessment details: To date, Tilton Nissen has received 18 Physical Therapy visits consisting of manual therapy techniques and therapeutic exercises  Frederick Plaza has been able to return to prior level of function including community ambulation without deviation or limitation and a modified premorbid exercise regimen  She reports of continued pain/difficulty with prolonged sitting (>1 hour) and deep squatting and 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  HEP was reviewed which patient was able to demonstrate independently  Frederick Plaza is to be discharged from skilled Physical Therapy services at this time secondary to achievement of functional goals and independence with home exercise program      Impairments: activity intolerance, impaired physical strength, lacks appropriate home exercise program and poor body mechanics  Functional limitations: Prolonged sitting, squatting, traveling for work, premorbid exercise regimenUnderstanding of Dx/Px/POC: good   Prognosis: good    Goals  Short Term Goals: Target Date 18  1  Initiate and advance HEP - achieved  2  Increase left hip AROM by at least 20 deg - achieved  3  Increase left LE MMT by at least 1/2 grade - achieved  4  Pt will be able to ambulate community distances without cane - achieved  5  Pt will be able to ascend stairs with a Reciprocal pattern with UE support  - achieved  6  Pt will be able to squat to approx 90 deg knee flexion using proper form with PT cuing - achieved    Long Term Goals: Target Date 10/17/18  1  Indep with HEP - progressing towards  2   Increase right hip AROM to WNL in all planes - achieved  3  Increase left LE MMT to at least 4+/5 - mostly achieved  4  Pt will be able to ambulate community distances without gross deviation - achieved  5  Pt will be able to negotiate stairs with a reciprocal pattern without UE support  - achieved  6  Pt will be able to perform a full squat using proper form without PT cuing - progressing towards  7  Pt will be able to return to PLOF including LE resistance training and cycling without limitation - progressing towards              Plan  Plan details: Pt is discharged from skilled PT services  Planned therapy interventions: home exercise program  Treatment plan discussed with: patient        Subjective Evaluation    History of Present Illness  Mechanism of injury: Pt reports of an approx 4 year history of L hip pain that began with a  "clicking" sensation during ambulation  Over time the pain progressed, and she began experiencing sharp pain with hip IR  Saw an ortho who performed performed x-ray (OA, osteoporosis) and MRI  Pain has been managed conservatively with injections x4 over the past year and PRP injection with good relief  Pain continued to increase post injections, so she decided to undergo a L THR on 18 with Dr Yael Gibson  Pt received home PT for 3 visits before D/C, and states she has been ambulating 1 5 to 2 miles  10/23/2018: To date, pt has received 18 PT visits  Overall, Alex Cain reports of a 70% improvement in function since beginning PT  Pt demonstrates improved body mechanics with squatting and has been able to return to ambulating community distances without AD  Alex Cain reports of continued pain/difficulty with prolinged sitting and deep squatting, and 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  Pt is to be discharged from skilled PT services at this time to indep with [facility]        Quality of life: good    Pain  Current pain ratin  At best pain ratin  At worst pain ratin  Location: L ant hip/groin  Quality: dull ache  Relieving factors: ice and rest  Aggravating factors: sitting (Prolonged sitting, deep squatting)  Progression: improved    Social Support  Steps to enter house: yes (2)  Stairs in house: yes (14)   Lives in: multiple-level home      Diagnostic Tests  X-ray: abnormal  MRI studies: abnormal  Treatments  Previous treatment: physical therapy  Current treatment: physical therapy  Patient Goals  Patient goals for therapy: decreased pain, improved balance, increased motion and return to sport/leisure activities  Patient goal: Return to cycling, LE resistance training and walking for exercise (Progressing towards, remains unable to return to premorbid exercise  per MD orders)        Objective     Static Posture     Comments  Normal standing posture without c/o    Observations   Left Hip  Positive for incision  Lumbar Screen  Lumbar range of motion within normal limits      Active Range of Motion   Left Hip   Flexion: 120 degrees   Extension: 20 degrees   Abduction: 40 degrees   External rotation (90/90): 85 degrees   Internal rotation (90/90): 20 degrees     Right Hip   Flexion: 127 degrees   Extension: 20 degrees   Abduction: 40 degrees   External rotation (90/90): 85 degrees   Internal rotation (90/90): 15 degrees   Left Knee   Normal active range of motion    Right Knee   Normal active range of motion  Left Ankle/Foot   Normal active range of motion    Right Ankle/Foot   Normal active range of motion    Passive Range of Motion   Left Hip   Normal passive range of motion    Right Hip   Normal passive range of motion    Strength/Myotome Testing     Left Hip   Planes of Motion   Flexion: 4  Extension: 5  Abduction: 4  Adduction: 4+  External rotation: 4+  Internal rotation: 4+    Right Hip   Normal muscle strength    Left Knee   Flexion: 5  Extension: 5    Right Knee   Normal strength    Left Ankle/Foot   Normal strength    Right Ankle/Foot   Normal strength    Tests     Left Hip   Modified Arsh: Positive  Ambulation     Ambulation: Stairs   Pattern: reciprocal  Pattern: reciprocal    Observational Gait   Gait: within functional limits     Additional Observational Gait Details  Pt ambulates with a normalized pattern without gross c/o    Functional Assessment     Single Leg Squat   Left Leg  Sitting toward right side, valgus and anterior tibial translation beyond toes  Right Leg  Within functional limits  Single Leg Stance   Left: 30 seconds  Right: 30 seconds    Comments  Squat:  Pt is able to squat to approx 90 deg knee flexion with mild anterior knee translation serge and weight shift onto forefoot  Slight weight shift to R LE with increased depth  Denies c/o    Sitting/driving: Pt able to sit/drive approx 60 mins before needing to stand    Remains unable to drive for work by MD Ana Corral      Most Recent Value   PT/OT G-Codes   Current Score  66   Projected Score  69   FOTO information reviewed  Yes   Assessment Type  Re-evaluation   G code set  Mobility: Walking & Moving Around   Mobility: Walking and Moving Around Current Status ()  CJ   Mobility: Walking and Moving Around Goal Status ()  CJ            Precautions: L Ant [facility]    Specialty Daily Treatment Diary     Manual 10/23/18  10/4/18 10/9/18 10/16/18   PROM All planes   All planes All planes All planes   IASTM Quads/ITB/ adductors w/the stick  Quads/ITB/ adductors w/the stick Quads/ITB/ adductors w/the stick Quads/ITB/ adductors w/the stick   Joint mobs        Man Flexibility HS/Rectus fem/piriformis  HS/recus fem/piriformis HS/recus fem/piriformis HS/recus fem/piriformis                   Exercise Diary         Eliptical   5min fwd/retro ea L7 5min fwd/retro ea L7 5min fwd/retro ea L7   Supine clamshells Short bridge 20x5" red w/abd  s/l 20x5" red s/l 20x5" green s/l 20x5" green   Ball squeeze   w/LTR 20x5" serge     Piriformis stretch 5x20"  5x20" 5x20"   Bridges SL on PB w/alt SLR x15 serge and bridge and curl x15   On PB w/alt SLR 2x10 serge    Wall squats BW squats 2x10  15x3" chair hover/SL eccentric x15 serge 20x5"    PPU  2x10  2x10 2x10 2x10   [place] [address] hip   2x10ea 2 5     Lat band walks  6x25ft blue    [place] walk-outs x5 ea 8 0   Step ups   2x10 14in     BOSU lateral step overs    SL RDL 2x10 serge 5 0 DB RDL to overhead press 2x10 serge 5lbs   Rev Lunges   SL RDL 2x10 serge 5 0 w/slides x10 serge    Supine hip flexor stretch 3x30"   3x30" serge    Hip wall stabilization   45 deg 5x5"/90 deg 5x5" LE cone taps 2x5 LE cone taps 2x5   s/l hip abd/add        Prayer stretch x520"  5x20" 5x20" 5x20"   Couch Stretch   3x30" 3x30" serge 3x30" serge   Prostretch   5x20" 5x20" 5x20"   SLR 2x10  2x20/Seated hip flex 20x5" 3 lbs Seated hip flexion 20x5"    Prone hip IR 2x10 green  2x10 red 2x10 red 2x10 green   Modalities

## 2018-10-24 ENCOUNTER — APPOINTMENT (OUTPATIENT)
Dept: PHYSICAL THERAPY | Facility: CLINIC | Age: 48
End: 2018-10-24
Payer: COMMERCIAL

## 2018-10-25 ENCOUNTER — APPOINTMENT (OUTPATIENT)
Dept: PHYSICAL THERAPY | Facility: CLINIC | Age: 48
End: 2018-10-25
Payer: COMMERCIAL

## 2019-06-01 ENCOUNTER — OFFICE VISIT (OUTPATIENT)
Dept: URGENT CARE | Facility: CLINIC | Age: 49
End: 2019-06-01
Payer: COMMERCIAL

## 2019-06-01 VITALS
TEMPERATURE: 99.8 F | OXYGEN SATURATION: 98 % | SYSTOLIC BLOOD PRESSURE: 122 MMHG | HEART RATE: 76 BPM | RESPIRATION RATE: 18 BRPM | BODY MASS INDEX: 23.32 KG/M2 | HEIGHT: 65 IN | WEIGHT: 140 LBS | DIASTOLIC BLOOD PRESSURE: 65 MMHG

## 2019-06-01 DIAGNOSIS — H02.89 PAIN AND SWELLING OF EYELID OF RIGHT EYE: Primary | ICD-10-CM

## 2019-06-01 DIAGNOSIS — H02.843 PAIN AND SWELLING OF EYELID OF RIGHT EYE: Primary | ICD-10-CM

## 2019-06-01 PROCEDURE — 99203 OFFICE O/P NEW LOW 30 MIN: CPT | Performed by: PHYSICIAN ASSISTANT

## 2019-06-01 RX ORDER — METHYLPREDNISOLONE 4 MG/1
TABLET ORAL
Qty: 21 EACH | Refills: 0 | Status: SHIPPED | OUTPATIENT
Start: 2019-06-01

## 2020-07-08 ENCOUNTER — EVALUATION (OUTPATIENT)
Dept: PHYSICAL THERAPY | Facility: CLINIC | Age: 50
End: 2020-07-08
Payer: COMMERCIAL

## 2020-07-08 ENCOUNTER — TRANSCRIBE ORDERS (OUTPATIENT)
Dept: PHYSICAL THERAPY | Facility: CLINIC | Age: 50
End: 2020-07-08

## 2020-07-08 DIAGNOSIS — M54.12 CERVICAL RADICULOPATHY: Primary | ICD-10-CM

## 2020-07-08 PROCEDURE — 97161 PT EVAL LOW COMPLEX 20 MIN: CPT | Performed by: PHYSICAL THERAPIST

## 2020-07-08 NOTE — PROGRESS NOTES
PT Evaluation     Today's date: 2020  Patient name: Rosaura Nayak  : 1970  MRN: [id number]  Referring provider: Carolina Esposito DO  Dx: No diagnosis found  Assessment  Assessment details: Tru Mabry with signs and symptoms consistent with Cervical radiculopathy  (primary encounter diagnosis), with loss of range of motion, strength and spinal stabilization  Presents with high reactivity  Rosaura Nayak would benefit with physical therapy to address these impairments to return to prior level of function  Impairments: abnormal or restricted ROM, activity intolerance, impaired physical strength, lacks appropriate home exercise program and pain with function  Understanding of Dx/Px/POC: good   Prognosis: good    Goals  STG  Initiate HEP  Able to drive 1 hour without pain in 3 weeks  LTG  Independent with HEP  Able to work at desk all day without pain in 6 weeks  FOTO > 54 in 6 weeks    Plan  Planned therapy interventions: manual therapy, neuromuscular re-education, patient education, postural training, strengthening, stretching, therapeutic exercise and home exercise program  Frequency: 2x week  Duration in visits: 12  Duration in weeks: 6  Treatment plan discussed with: patient        Subjective Evaluation    History of Present Illness  Mechanism of injury: In 3/20 she was knocked over by her dog, she felt a "pop" in her neck  Since this incident, she reports a numbness of the anterior left shoulder/chest area, and radiating numbness in the 4th and 5th fingers of the left hand  She had a bout of steroids and the numbness in the fingers diminished, but the shoulder numbness continues  She has a history of C5-6 fusion 15 years ago, and breast cancer, Also had a THR 2 years ago  She is to have a MRI later this week            Recurrent probem    Quality of life: good    Pain  Current pain rating: 3  At best pain rating: 3  At worst pain ratin  Location: left shoulder/neck Quality: discomfort, dull ache and radiating  Relieving factors: change in position  Aggravating factors: keyboarding and sitting    Social Support  Steps to enter house: yes  Stairs in house: yes   Lives in: multiple-level home    Treatments  Current treatment: physical therapy  Patient Goals  Patient goals for therapy: decreased pain, increased motion, increased strength, independence with ADLs/IADLs and return to sport/leisure activities          Objective     Concurrent Complaints  Positive for dizziness, headaches, history of cancer and history of trauma  Active Range of Motion   Cervical/Thoracic Spine       Cervical    Left rotation: 70 degrees with pain  Right rotation: 70 degrees    with pain    Thoracic    Flexion: 45 degrees   Extension: 25 degrees     with pain    Strength/Myotome Testing   Cervical Spine     Left   Interossei strength (t1): 5    Right   Interossei strength (t1): 5    Left Shoulder     Planes of Motion   Abduction: 5     Right Shoulder     Planes of Motion   Abduction: 5     Left Elbow   Flexion: 5  Extension: 5    Right Elbow   Flexion: 5  Extension: 5    Left Wrist/Hand   Wrist extension: 5  Wrist flexion: 5  Thumb extension: 5    Right Wrist/Hand   Wrist extension: 5  Wrist flexion: 5  Thumb extension: 5  Neuro Exam:     Headaches   Patient reports headaches: Yes         Flowsheet Rows      Most Recent Value   PT/OT G-Codes   Current Score  38   Projected Score  54             Precautions: THR      Manuals                                                                 Neuro Re-Ed                                                                                                        Ther Ex 7/8            TA Activation 3x            Scapular Retraction 3x            Chin-tuck/head press 3x                                                                             Ther Activity                                       Gait Training                                       Modalities

## 2020-07-08 NOTE — LETTER
2020    [address]    Patient: Jay Jay Henderson   YOB: 1970   Date of Visit: 2020     Encounter Diagnosis     ICD-10-CM    1  Cervical radiculopathy M54 12        Dear Dr Flores Sifuentes:    Thank you for your recent referral of Jay Jay Henderson  Please review the attached evaluation summary from Sanjay's recent visit  Please verify that you agree with the plan of care by signing the attached order  If you have any questions or concerns, please do not hesitate to call  I sincerely appreciate the opportunity to share in the care of one of your patients and hope to have another opportunity to work with you in the near future  Sincerely,    Darrick Lama, PT      Referring Provider:      I certify that I have read the below Plan of Care and certify the need for these services furnished under this plan of treatment while under my care  Scar Blanco DO  [address] [place] [address] Avenue: [phone]          PT Evaluation     Today's date: 2020  Patient name: Jay Jay Henderson  : 1970  MRN: [id number]  Referring provider: Ninoska Hightower DO  Dx: No diagnosis found  Assessment  Assessment details: Lorelle Screen with signs and symptoms consistent with Cervical radiculopathy  (primary encounter diagnosis), with loss of range of motion, strength and spinal stabilization  Presents with high reactivity  Jay Jay Henderson would benefit with physical therapy to address these impairments to return to prior level of function    Impairments: abnormal or restricted ROM, activity intolerance, impaired physical strength, lacks appropriate home exercise program and pain with function  Understanding of Dx/Px/POC: good   Prognosis: good    Goals  STG  Initiate HEP  Able to drive 1 hour without pain in 3 weeks  LTG  Independent with HEP  Able to work at desk all day without pain in 6 weeks  FOTO > 54 in 6 weeks    Plan  Planned therapy interventions: manual therapy, neuromuscular re-education, patient education, postural training, strengthening, stretching, therapeutic exercise and home exercise program  Frequency: 2x week  Duration in visits: 12  Duration in weeks: 6  Treatment plan discussed with: patient        Subjective Evaluation    History of Present Illness  Mechanism of injury: In 3/20 she was knocked over by her dog, she felt a "pop" in her neck  Since this incident, she reports a numbness of the anterior left shoulder/chest area, and radiating numbness in the 4th and 5th fingers of the left hand  She had a bout of steroids and the numbness in the fingers diminished, but the shoulder numbness continues  She has a history of C5-6 fusion 15 years ago, and breast cancer, Also had a THR 2 years ago  She is to have a MRI later this week  Recurrent probem    Quality of life: good    Pain  Current pain rating: 3  At best pain rating: 3  At worst pain ratin  Location: left shoulder/neck   Quality: discomfort, dull ache and radiating  Relieving factors: change in position  Aggravating factors: keyboarding and sitting    Social Support  Steps to enter house: yes  Stairs in house: yes   Lives in: multiple-level home    Treatments  Current treatment: physical therapy  Patient Goals  Patient goals for therapy: decreased pain, increased motion, increased strength, independence with ADLs/IADLs and return to sport/leisure activities          Objective     Concurrent Complaints  Positive for dizziness, headaches, history of cancer and history of trauma       Active Range of Motion   Cervical/Thoracic Spine       Cervical    Left rotation: 70 degrees with pain  Right rotation: 70 degrees    with pain    Thoracic    Flexion: 45 degrees   Extension: 25 degrees     with pain    Strength/Myotome Testing   Cervical Spine     Left   Interossei strength (t1): 5    Right   Interossei strength (t1): 5    Left Shoulder     Planes of Motion   Abduction: 5     Right Shoulder     Planes of Motion   Abduction: 5     Left Elbow   Flexion: 5  Extension: 5    Right Elbow   Flexion: 5  Extension: 5    Left Wrist/Hand   Wrist extension: 5  Wrist flexion: 5  Thumb extension: 5    Right Wrist/Hand   Wrist extension: 5  Wrist flexion: 5  Thumb extension: 5  Neuro Exam:     Headaches   Patient reports headaches: Yes         Flowsheet Rows      Most Recent Value   PT/OT G-Codes   Current Score  38   Projected Score  54             Precautions: THR      Manuals                                                                 Neuro Re-Ed                                                                                                        Ther Ex 7/8            TA Activation 3x            Scapular Retraction 3x            Chin-tuck/head press 3x                                                                             Ther Activity                                       Gait Training                                       Modalities

## 2020-07-13 ENCOUNTER — OFFICE VISIT (OUTPATIENT)
Dept: PHYSICAL THERAPY | Facility: CLINIC | Age: 50
End: 2020-07-13
Payer: COMMERCIAL

## 2020-07-13 DIAGNOSIS — M54.12 CERVICAL RADICULOPATHY: Primary | ICD-10-CM

## 2020-07-13 PROCEDURE — 97110 THERAPEUTIC EXERCISES: CPT | Performed by: PHYSICAL THERAPIST

## 2020-07-13 NOTE — PROGRESS NOTES
Daily Note     Today's date: 2020  Patient name: Abhinav Arreola  : 1970  MRN: [id number]  Referring provider: Dorian Green DO  Dx:   Encounter Diagnosis     ICD-10-CM    1  Cervical radiculopathy M54 12                   Subjective: I had severe migraine headache immediately after PT  Had a CAT Scan and MRI last week, don't know results yet  Objective: See treatment diary below      Assessment: Tolerated treatment fair, patient with possible instability in the cervical spine, to hold any manual therapy until results of imagining clear this modality  Plan: Hold PT until results of imagining clear for PT       Precautions: THR      Manuals                                                                 Neuro Re-Ed                                                                                                        Ther Ex            TA Activation 3x 5x           Scapular Retraction 3x 5x           Chin-tuck/head press 3x 5x           Leg lengthening  5x           Leg Press   5x           Buttock squeeze  5x                                     Ther Activity                                       Gait Training                                       Modalities

## 2020-07-15 ENCOUNTER — APPOINTMENT (OUTPATIENT)
Dept: PHYSICAL THERAPY | Facility: CLINIC | Age: 50
End: 2020-07-15
Payer: COMMERCIAL

## 2020-07-20 ENCOUNTER — APPOINTMENT (OUTPATIENT)
Dept: PHYSICAL THERAPY | Facility: CLINIC | Age: 50
End: 2020-07-20
Payer: COMMERCIAL

## 2020-07-22 ENCOUNTER — APPOINTMENT (OUTPATIENT)
Dept: PHYSICAL THERAPY | Facility: CLINIC | Age: 50
End: 2020-07-22
Payer: COMMERCIAL

## 2020-07-27 ENCOUNTER — APPOINTMENT (OUTPATIENT)
Dept: PHYSICAL THERAPY | Facility: CLINIC | Age: 50
End: 2020-07-27
Payer: COMMERCIAL

## 2020-07-29 ENCOUNTER — APPOINTMENT (OUTPATIENT)
Dept: PHYSICAL THERAPY | Facility: CLINIC | Age: 50
End: 2020-07-29
Payer: COMMERCIAL

## 2020-08-24 NOTE — PROGRESS NOTES
Self- Discharge     Patient was previously attending skilled PT, but has not returned phone calls or scheduled subsequent physical therapy appointments  Patient will be considered a self discharge per hospital policy

## 2022-08-24 ENCOUNTER — OFFICE VISIT (OUTPATIENT)
Dept: URGENT CARE | Facility: CLINIC | Age: 52
End: 2022-08-24
Payer: COMMERCIAL

## 2022-08-24 VITALS
BODY MASS INDEX: 22.47 KG/M2 | HEART RATE: 82 BPM | OXYGEN SATURATION: 100 % | WEIGHT: 135 LBS | RESPIRATION RATE: 16 BRPM

## 2022-08-24 DIAGNOSIS — T07.XXXA ABRASIONS OF MULTIPLE SITES: Primary | ICD-10-CM

## 2022-08-24 PROCEDURE — 99213 OFFICE O/P EST LOW 20 MIN: CPT | Performed by: PHYSICIAN ASSISTANT

## 2022-08-24 NOTE — PROGRESS NOTES
[facility] [employer] [facility]        NAME: Issa Pacheco is a 45 y o  female  : 1970    MRN: [id number]  DATE: 2022  TIME: 7:15 PM    Assessment and Plan   Abrasions of multiple sites [T07  XXXA]  1  Abrasions of multiple sites           Patient Instructions     Patient Instructions   Wounds cleaned dried and dressed  Recommend keeping wounds clean and dry for next 24-48 hours, can continue to apply antibiotic ointment during this time  Can take over-the-counter ibuprofen or Tylenol as needed for any discomfort  With any progression or worsening of symptoms as discussed to return or be seen in ER  Patient verbalizes understanding and is agreeable to this plan  Follow up with PCP in 3-5 days  Proceed to  ER if symptoms worsen  Chief Complaint     Chief Complaint   Patient presents with    Wound     Pulled by dog this AM         History of Present Illness       Patient is a 45 female presenting today with abrasions of right arm and right side of face x2 hours  Patient notes while at the veterinary is office this afternoon walking her dog that her dog pulled her on his leash resulting in her losing her balance and falling down on her right side, notes that initially her right arm hit the ground and then struck the right side of her face against the concrete, states that she has sustained abrasions of her right forearm right shoulder and right cheek, attempted to clean the wounds at home but was struggling due to discomfort and is worried about progression to infection  Notes now she is also experiencing a slight headache, has an appointment with her neurologist tomorrow  Denies LOC, lightheadedness, dizziness, vision changes, nausea, vomiting  Review of Systems   Review of Systems   Constitutional: Negative for chills and fever  HENT: Negative for ear pain and sore throat  Eyes: Negative for pain and visual disturbance  Respiratory: Negative for cough and shortness of breath  Cardiovascular: Negative for chest pain and palpitations  Gastrointestinal: Negative for abdominal pain and vomiting  Genitourinary: Negative for dysuria and hematuria  Musculoskeletal: Negative for arthralgias and back pain  Skin: Positive for wound  Neurological: Positive for headaches  Negative for seizures and syncope  All other systems reviewed and are negative  Current Medications       Current Outpatient Medications:     Aspirin (ECOTRIN PO), Take by mouth, Disp: , Rfl:     Celecoxib (CELEBREX PO), Take by mouth, Disp: , Rfl:     methylPREDNISolone 4 MG tablet therapy pack, Use as directed on package, Disp: 21 each, Rfl: 0    Multiple Vitamins-Minerals (EQL AIR PROTECTOR PO), Take by mouth, Disp: , Rfl:     Oxycodone-Acetaminophen (PERCOCET PO), Take by mouth, Disp: , Rfl:     Current Allergies     Allergies as of 08/24/2022 - Reviewed 08/24/2022   Allergen Reaction Noted    Gluten meal - food allergy  08/30/2018            The following portions of the patient's history were reviewed and updated as appropriate: allergies, current medications, past family history, past medical history, past social history, past surgical history and problem list      Past Medical History:   Diagnosis Date    Cancer ([facility] [address] )     20 years ago, Breast       Past Surgical History:   Procedure Laterality Date    CERVICAL DISCECTOMY      KNEE ARTHROSCOPY         History reviewed  No pertinent family history  Medications have been verified  Objective   Pulse 82   Resp 16   Wt 61 2 kg (135 lb)   SpO2 100%   BMI 22 47 kg/m²        Physical Exam     Physical Exam  Vitals and nursing note reviewed  Constitutional:       General: She is not in acute distress  Appearance: Normal appearance  She is not ill-appearing  HENT:      Head: Normocephalic        Comments: Small superficial abrasion of right cheek with surrounding bruising, area is moderately tender to palpation     Right Ear: Tympanic membrane, ear canal and external ear normal       Left Ear: Tympanic membrane, ear canal and external ear normal       Nose: Nose normal       Mouth/Throat:      Mouth: Mucous membranes are moist       Pharynx: Oropharynx is clear  Eyes:      Extraocular Movements: Extraocular movements intact  Pupils: Pupils are equal, round, and reactive to light  Comments: No pain with EOMs, no photophobia, vision grossly intact   Cardiovascular:      Rate and Rhythm: Normal rate  Pulses: Normal pulses  Heart sounds: Normal heart sounds  Pulmonary:      Effort: Pulmonary effort is normal       Breath sounds: Normal breath sounds  Musculoskeletal:      Cervical back: Normal range of motion  Skin:            Comments: Superficial abrasion of anterior aspect of right forearm and of right shoulder, presence of gravel superficially in areas of the wounds, no active bleeding, full ROM of upper extremities bilaterally without discomfort, normal strength of upper extremities bilaterally, 2+ distal pulses, gross sensation intact   Neurological:      General: No focal deficit present  Mental Status: She is alert and oriented to person, place, and time  Cranial Nerves: No cranial nerve deficit        Coordination: Coordination normal       Gait: Gait normal

## 2022-08-24 NOTE — PATIENT INSTRUCTIONS
Wounds cleaned dried and dressed  Recommend keeping wounds clean and dry for next 24-48 hours, can continue to apply antibiotic ointment during this time  Can take over-the-counter ibuprofen or Tylenol as needed for any discomfort  With any progression or worsening of symptoms as discussed to return or be seen in ER  Patient verbalizes understanding and is agreeable to this plan

## 2022-10-24 ENCOUNTER — EVALUATION (OUTPATIENT)
Dept: PHYSICAL THERAPY | Facility: CLINIC | Age: 52
End: 2022-10-24
Payer: COMMERCIAL

## 2022-10-24 DIAGNOSIS — M54.12 CERVICAL RADICULOPATHY: Primary | ICD-10-CM

## 2022-10-24 PROCEDURE — 97161 PT EVAL LOW COMPLEX 20 MIN: CPT | Performed by: PHYSICAL THERAPIST

## 2022-10-24 NOTE — PROGRESS NOTES
PT Evaluation     Today's date: 10/24/2022  Patient name: Justino Richards  : 1970  MRN: [id number]  Referring provider: Jeanne Damico MD  Dx:   Encounter Diagnosis     ICD-10-CM    1  Cervical radiculopathy  M54 12                   Assessment  Assessment details: Lorraine Hoover with signs and symptoms consistent with Cervical radiculopathy  (primary encounter diagnosis), with loss of range of motion, strength and spinal stabilization  Presents with high reactivity  Justino Richards would benefit with physical therapy to address these impairments to return to prior level of function  Impairments: abnormal or restricted ROM, activity intolerance, impaired physical strength, lacks appropriate home exercise program, pain with function and poor posture   Understanding of Dx/Px/POC: good   Prognosis: good    Goals  STG  Initiate HEP  Decrease pain by 50% in 3 weeks  Patient performing HEP 50% of time in 3 weeks  LTG  Independent with HEP  Decrease pain by 90% in 6 weeks  Patient performing HEP 90% of time in 6 weeks  FOTO > 41     in 6 weeks    Plan  Planned therapy interventions: joint mobilization, manual therapy, neuromuscular re-education, patient education, postural training, strengthening, stretching, therapeutic exercise, functional ROM exercises and home exercise program  Frequency: 2x week  Duration in visits: 12  Duration in weeks: 6  Treatment plan discussed with: patient        Subjective Evaluation    History of Present Illness  Mechanism of injury: Patient reports falling on her right shoulder and dragged by her dog on 22  She has chronic neck pain and exercises frequently  Pain is intermittent, she reports weakness of both hands  Recurrent probem    Quality of life: good    Pain  Current pain ratin  At best pain ratin  At worst pain rating: 10  Location: Right side neck radiates down arm    Quality: burning and needle-like  Relieving factors: rest, change in position and medications  Aggravating factors: lifting and overhead activity          Objective     Concurrent Complaints  Positive for night pain, disturbed sleep, headaches, history of cancer and history of trauma  Postural Observations  Seated posture: poor  Standing posture: poor  Correction of posture: makes symptoms better        Active Range of Motion   Cervical/Thoracic Spine       Cervical    Flexion: 70 degrees  with pain  Extension: 20 degrees     with pain  Left rotation: 80 degrees  Right rotation: 70 degrees    with pain  Neuro Exam:     Headaches   Patient reports headaches: Yes                Precautions: Medical History    Diagnosis Date Comment Source   Cancer [facility])  20 years ago, Breast            Manuals 10/24            [facility]                                                   Neuro Re-Ed             Posture instruct perf                                                                                          Ther Ex             Scap retraction 10x            Cervical retraction 10x                                                                                          Ther Activity                                       Gait Training                                       Modalities

## 2022-10-24 NOTE — LETTER
2022    Charmaine Castellanos MD  [address] [place] [address] [place] [address]    Patient: Paola Harrington   YOB: 1970   Date of Visit: 10/24/2022     Encounter Diagnosis     ICD-10-CM    1  Cervical radiculopathy  M54 12        Dear Dr Stormy Rock:    Thank you for your recent referral of Paola Harrington  Please review the attached evaluation summary from Sanjay's recent visit  Please verify that you agree with the plan of care by signing the attached order  If you have any questions or concerns, please do not hesitate to call  I sincerely appreciate the opportunity to share in the care of one of your patients and hope to have another opportunity to work with you in the near future  Sincerely,    Sandoval Diaz, PT      Referring Provider:      I certify that I have read the below Plan of Care and certify the need for these services furnished under this plan of treatment while under my care  Charmaine Castellanos MD  [address] [place] [address] [place] [address]  Via Fax: [phone]          PT Evaluation     Today's date: 10/24/2022  Patient name: Paola Harrington  : 1970  MRN: [id number]  Referring provider: Gin Lew MD  Dx:   Encounter Diagnosis     ICD-10-CM    1  Cervical radiculopathy  M54 12                   Assessment  Assessment details: Gopi Bcukner with signs and symptoms consistent with Cervical radiculopathy  (primary encounter diagnosis), with loss of range of motion, strength and spinal stabilization  Presents with high reactivity  Paola Harrington would benefit with physical therapy to address these impairments to return to prior level of function    Impairments: abnormal or restricted ROM, activity intolerance, impaired physical strength, lacks appropriate home exercise program, pain with function and poor posture   Understanding of Dx/Px/POC: good   Prognosis: good    Goals  STG  Initiate HEP  Decrease pain by 50% in 3 weeks  Patient performing HEP 50% of time in 3 weeks  LTG  Independent with HEP  Decrease pain by 90% in 6 weeks  Patient performing HEP 90% of time in 6 weeks  FOTO > 41     in 6 weeks    Plan  Planned therapy interventions: joint mobilization, manual therapy, neuromuscular re-education, patient education, postural training, strengthening, stretching, therapeutic exercise, functional ROM exercises and home exercise program  Frequency: 2x week  Duration in visits: 12  Duration in weeks: 6  Treatment plan discussed with: patient        Subjective Evaluation    History of Present Illness  Mechanism of injury: Patient reports falling on her right shoulder and dragged by her dog on 22  She has chronic neck pain and exercises frequently  Pain is intermittent, she reports weakness of both hands  Recurrent probem    Quality of life: good    Pain  Current pain ratin  At best pain ratin  At worst pain rating: 10  Location: Right side neck radiates down arm  Quality: burning and needle-like  Relieving factors: rest, change in position and medications  Aggravating factors: lifting and overhead activity          Objective     Concurrent Complaints  Positive for night pain, disturbed sleep, headaches, history of cancer and history of trauma  Postural Observations  Seated posture: poor  Standing posture: poor  Correction of posture: makes symptoms better        Active Range of Motion   Cervical/Thoracic Spine       Cervical    Flexion: 70 degrees  with pain  Extension: 20 degrees     with pain  Left rotation: 80 degrees  Right rotation: 70 degrees    with pain  Neuro Exam:     Headaches   Patient reports headaches: Yes                Precautions: Medical History    Diagnosis Date Comment Source   Cancer [facility])  20 years ago, Breast            Manuals 10/24            [facility] fig 8 gentle                                                   Neuro Re-Ed             Posture instruct perf Ther Ex             Scap retraction 10x            Cervical retraction 10x                                                                                          Ther Activity                                       Gait Training                                       Modalities

## 2022-10-25 ENCOUNTER — OFFICE VISIT (OUTPATIENT)
Dept: PHYSICAL THERAPY | Facility: CLINIC | Age: 52
End: 2022-10-25
Payer: COMMERCIAL

## 2022-10-25 DIAGNOSIS — M54.12 CERVICAL RADICULOPATHY: Primary | ICD-10-CM

## 2022-10-25 PROCEDURE — 97140 MANUAL THERAPY 1/> REGIONS: CPT | Performed by: PHYSICAL MEDICINE & REHABILITATION

## 2022-10-25 PROCEDURE — 97112 NEUROMUSCULAR REEDUCATION: CPT | Performed by: PHYSICAL MEDICINE & REHABILITATION

## 2022-10-25 NOTE — PROGRESS NOTES
Daily Note     Today's date: 10/25/2022  Patient name: Rebeca Diamond  : 1970  MRN: [id number]  Referring provider: Miroslava Rocha MD  Dx:   Encounter Diagnosis     ICD-10-CM    1  Cervical radiculopathy  M54 12                   Subjective: Patient reports that she felt better after evaluation, noting improvement in pain  Objective: See treatment diary below      Assessment: Tolerated treatment well  Patient advised on slow pain free progressions  Patient exhibited good technique with therapeutic exercises and would benefit from continued PT      Plan: Continue per plan of care        Precautions: Medical History    Diagnosis Date Comment Source   Cancer [facility])  20 years ago, Breast            Manuals 10/24 10/25           [facility] TC                                                  Neuro Re-Ed             Posture instruct perf            Ulnar nerve glide  TC                                                                            Ther Ex             Scap retraction 10x TC           Cervical retraction 10x                                                                                          Ther Activity                                       Gait Training                                       Modalities

## 2022-10-31 ENCOUNTER — OFFICE VISIT (OUTPATIENT)
Dept: PHYSICAL THERAPY | Facility: CLINIC | Age: 52
End: 2022-10-31

## 2022-10-31 DIAGNOSIS — M54.12 CERVICAL RADICULOPATHY: Primary | ICD-10-CM

## 2022-10-31 NOTE — PROGRESS NOTES
Daily Note     Today's date: 10/31/2022  Patient name: Pallavi Pizarro  : 1970  MRN: [id number]  Referring provider: Soren Zuniga MD  Dx:   Encounter Diagnosis     ICD-10-CM    1  Cervical radiculopathy  M54 12                   Subjective: Patient reports today feeling quite sore, noting that it is because of the weather  Objective: See treatment diary below      Assessment: Tolerated treatment well  Initiated light parascapular strengthening today  Patient exhibited good technique with therapeutic exercises and would benefit from continued PT      Plan: Continue per plan of care        Precautions: Medical History    Diagnosis Date Comment Source   Cancer [facility])  20 years ago, Breast            Manuals 10/24 10/25 10/31          STM/fig 8 gentle TC TC                                                 Neuro Re-Ed             Posture instruct perf            Ulnar nerve glide  TC TC          Rows   GTB 20x          Shld ext   RTB 20x          Shld ER   RTB 20x                                    Ther Ex             Scap retraction 10x TC TC          Cervical retraction 10x                                                                                          Ther Activity                                       Gait Training                                       Modalities

## 2022-11-03 ENCOUNTER — OFFICE VISIT (OUTPATIENT)
Dept: PHYSICAL THERAPY | Facility: CLINIC | Age: 52
End: 2022-11-03

## 2022-11-03 DIAGNOSIS — M54.12 CERVICAL RADICULOPATHY: Primary | ICD-10-CM

## 2022-11-03 NOTE — PROGRESS NOTES
Daily Note     Today's date: 11/3/2022  Patient name: Na Maynard  : 1970  MRN: [id number]  Referring provider: Kylie Romo MD  Dx:   Encounter Diagnosis     ICD-10-CM    1  Cervical radiculopathy  M54 12                   Subjective: I am most concerned with tremor in my left arm  Objective: See treatment diary below      Assessment: Tolerated treatment well  Patient demonstrated fatigue post treatment and exhibited good technique with therapeutic exercises  Pre-tx: Rot left 70 deg    Rot rt 50 degrees  PostTx:  Rot Left 75 deg   Rotation Right 70 degrees    Plan: Continue per plan of care        Precautions: Medical History    Diagnosis Date Comment Source   Cancer [facility])  20 years ago, Breast            Manuals 10/24 10/25 10/31 11/3         STM/fig 8 gentle TC TC mv         Arc stretching    mv upper arc                                   Neuro Re-Ed             Posture instruct perf            Ulnar nerve glide  TC TC          Rows   GTB 20x          Shld ext   RTB 20x          Shld ER   RTB 20x          PNF contract-relax for Cerv Rot    2x5                      Ther Ex             Scap retraction 10x TC TC MV         Cervical retraction 10x   MV                                                                                       Ther Activity                                       Gait Training                                       Modalities

## 2022-11-07 ENCOUNTER — OFFICE VISIT (OUTPATIENT)
Dept: PHYSICAL THERAPY | Facility: CLINIC | Age: 52
End: 2022-11-07

## 2022-11-07 DIAGNOSIS — M54.12 CERVICAL RADICULOPATHY: Primary | ICD-10-CM

## 2022-11-07 NOTE — PROGRESS NOTES
Daily Note     Today's date: 2022  Patient name: Dev Comer  : 1970  MRN: [id number]  Referring provider: Cristiane Jones MD  Dx:   Encounter Diagnosis     ICD-10-CM    1  Cervical radiculopathy  M54 12                   Subjective: My left hand continues with tremors  Objective: See treatment diary below      Assessment: Tolerated treatment well  Patient demonstrated fatigue post treatment and exhibited good technique with therapeutic exercises      Plan: Continue per plan of care        Precautions: Medical History    Diagnosis Date Comment Source   Cancer [facility])  20 years ago, Breast            Manuals 10/24 10/25 10/31 11/3 11/7        STM/fig 8 gentle TC TC mv MV        Arc stretching    mv upper arc MV upper                                  Neuro Re-Ed             Posture instruct perf            Ulnar nerve glide  TC TC          Rows   GTB 20x          Shld ext   RTB 20x          Shld ER   RTB 20x          PNF contract-relax for Cerv Rot    2x5                      Ther Ex             Scap retraction 10x TC TC MV         Cervical retraction 10x   MV                      Neurac cerv retract supine     2x5        Neurac scap retract supine     2x5        Neurac scap retract w depression sit     2x5        Neurac Thoracic Ext sit     2x5        Elbow press 3 position     3x5s                                               Gait Training                                       Modalities

## 2022-11-10 ENCOUNTER — APPOINTMENT (OUTPATIENT)
Dept: PHYSICAL THERAPY | Facility: CLINIC | Age: 52
End: 2022-11-10

## 2022-11-14 ENCOUNTER — APPOINTMENT (OUTPATIENT)
Dept: PHYSICAL THERAPY | Facility: CLINIC | Age: 52
End: 2022-11-14

## 2022-11-17 ENCOUNTER — OFFICE VISIT (OUTPATIENT)
Dept: PHYSICAL THERAPY | Facility: CLINIC | Age: 52
End: 2022-11-17

## 2022-11-17 DIAGNOSIS — M54.12 CERVICAL RADICULOPATHY: Primary | ICD-10-CM

## 2022-11-17 NOTE — PROGRESS NOTES
Daily Note     Today's date: 2022  Patient name: Riaz Buck  : 1970  MRN: [id number]  Referring provider: Olga Fletcher MD  Dx:   Encounter Diagnosis     ICD-10-CM    1  Cervical radiculopathy  M54 12                      Subjective: I was very sore after redcord, I worked out in gym today already  Objective: See treatment diary below      Assessment: Tolerated treatment well  Patient demonstrated fatigue post treatment and exhibited good technique with therapeutic exercises      Plan: Continue per plan of care        Precautions: Medical History    Diagnosis Date Comment Source   Cancer [facility])  20 years ago, Breast            Manuals 10/24 10/25 10/31 11/3 11/7 11/17       STM/fig 8 gentle TC TC mv MV MV       Arc stretching    mv upper arc MV upper MV upper                                 Neuro Re-Ed             Posture instruct perf            Ulnar nerve glide  TC TC          Rows   GTB 20x          Shld ext   RTB 20x          Shld ER   RTB 20x          PNF contract-relax for Cerv Rot    2x5  10x                    Ther Ex             Scap retraction 10x TC TC MV  10x       Cervical retraction 10x   MV  10x                    Neurac cerv retract supine     2x5        Neurac scap retract supine     2x5        Neurac scap retract w depression sit     2x5        Neurac Thoracic Ext sit     2x5        Elbow press 3 position     3x5s                                               Gait Training                                       Modalities

## 2022-11-21 ENCOUNTER — OFFICE VISIT (OUTPATIENT)
Dept: PHYSICAL THERAPY | Facility: CLINIC | Age: 52
End: 2022-11-21

## 2022-11-21 DIAGNOSIS — M54.12 CERVICAL RADICULOPATHY: Primary | ICD-10-CM

## 2022-11-21 NOTE — PROGRESS NOTES
Daily Note     Today's date: 2022  Patient name: Janice Paige  : 1970  MRN: [id number]  Referring provider: Geremias Soria MD  Dx:   Encounter Diagnosis     ICD-10-CM    1  Cervical radiculopathy  M54 12                      Subjective: I have right sided neck stiffness  Objective: See treatment diary below      Assessment: Tolerated treatment well  Patient demonstrated fatigue post treatment and exhibited good technique with therapeutic exercises      Plan: Continue per plan of care        Precautions: Medical History    Diagnosis Date Comment Source   Cancer [facility])  20 years ago, Breast            Manuals 10/24 10/25 10/31 11/3 11/7 11/17 11/21      STM/fig 8 gentle TC TC mv MV MV       Arc stretching    mv upper arc MV upper MV upper                                 Neuro Re-Ed             Posture instruct perf            Ulnar nerve glide  TC TC          Rows   GTB 20x          Shld ext   RTB 20x          Shld ER   RTB 20x          PNF contract-relax for Cerv Rot    2x5  10x                    Ther Ex             Scap retraction 10x TC TC MV  10x       Cervical retraction 10x   MV  10x                    Neurac cerv retract supine     2x5  2x5      Neurac scap retract supine     2x5  2x5      Neurac scap retract w depression sit     2x5  2x5      Neurac Thoracic Ext sit     2x5  2x5      Elbow press 3 position     3x5s                                               Gait Training                                       Modalities

## 2022-11-23 ENCOUNTER — OFFICE VISIT (OUTPATIENT)
Dept: PHYSICAL THERAPY | Facility: CLINIC | Age: 52
End: 2022-11-23

## 2022-11-23 DIAGNOSIS — M54.12 CERVICAL RADICULOPATHY: Primary | ICD-10-CM

## 2022-11-23 NOTE — PROGRESS NOTES
Daily Note     Today's date: 2022  Patient name: Octavia Ferguson  : 1970  MRN: [id number]  Referring provider: Jairo Bloom MD  Dx:   Encounter Diagnosis     ICD-10-CM    1  Cervical radiculopathy  M54 12                      Subjective: Patient reports she had increased tremors in her left arm following last session  Patient notes her left arm feels like it's 100 lbs  Objective: See treatment diary below      Assessment: Tolerated treatment well  Initiated nerve glides of LLE and 1st rib mobilization, patient with increased sensation in left hand end of session  HEP updated to include nerve glides  Patient demonstrated fatigue post treatment and exhibited good technique with therapeutic exercises      Plan: Continue per plan of care        Precautions: Medical History    Diagnosis Date Comment Source   Cancer [facility])  20 years ago, Breast            Manuals 10/24 10/25 10/31 11/3 11/7 11/17 11/21 11/23     STM/fig 8 gentle TC TC mv MV MV  TC     Arc stretching    mv upper arc MV upper MV upper       1st rib mobilization        L - TC     STM left scalenes        TC     Nerve mobility  Radial  Median  Ulnar        TC     Neuro Re-Ed             Posture instruct perf            HEP update        To include nerve mobilization     Ulnar nerve glide  TC TC          Rows   GTB 20x          Shld ext   RTB 20x          Shld ER   RTB 20x          PNF contract-relax for Cerv Rot    2x5  10x                    Ther Ex             Scap retraction 10x TC TC MV  10x       Cervical retraction 10x   MV  10x                    Neurac cerv retract supine     2x5  2x5      Neurac scap retract supine     2x5  2x5      Neurac scap retract w depression sit     2x5  2x5      Neurac Thoracic Ext sit     2x5  2x5      Elbow press 3 position     3x5s                                               Gait Training                                       Modalities

## 2022-11-28 ENCOUNTER — APPOINTMENT (OUTPATIENT)
Dept: PHYSICAL THERAPY | Facility: CLINIC | Age: 52
End: 2022-11-28

## 2022-11-29 ENCOUNTER — APPOINTMENT (OUTPATIENT)
Dept: PHYSICAL THERAPY | Facility: CLINIC | Age: 52
End: 2022-11-29

## 2023-04-25 ENCOUNTER — TELEPHONE (OUTPATIENT)
Dept: FAMILY MEDICINE CLINIC | Facility: CLINIC | Age: 53
End: 2023-04-25

## 2023-04-25 NOTE — TELEPHONE ENCOUNTER
Pt called and lmom on our VM  I called her back and gave her your direct number  Just wanted to document  If you can pass this on to the correct party  She has questions about a PT bill  Her VM is attached  Hi, this is Soraya Paredesfast  My phone number is [phone]  My birthday is [birth year] and I'm looking for a copy of an invoice or bill  Arsalan bernardo from February 13th 2023  My insurance company is questioning it  I do not have a copy of it so I'm hoping you could either e-mail it to me or I can come by and pick it up  There is a due date I have to get back to the insurance company before May 1st  Thank you  Right

## 2024-10-09 ENCOUNTER — APPOINTMENT (OUTPATIENT)
Dept: RADIOLOGY | Facility: CLINIC | Age: 54
End: 2024-10-09
Payer: COMMERCIAL

## 2024-10-09 DIAGNOSIS — M25.561 RIGHT KNEE PAIN, UNSPECIFIED CHRONICITY: ICD-10-CM

## 2024-10-09 DIAGNOSIS — K62.89 CHRONIC IDIOPATHIC ANAL PAIN: ICD-10-CM

## 2024-10-09 DIAGNOSIS — G89.29 CHRONIC IDIOPATHIC ANAL PAIN: ICD-10-CM

## 2024-10-09 PROCEDURE — 73562 X-RAY EXAM OF KNEE 3: CPT

## 2025-03-19 ENCOUNTER — HOSPITAL ENCOUNTER (EMERGENCY)
Facility: HOSPITAL | Age: 55
Discharge: HOME | End: 2025-03-19
Attending: EMERGENCY MEDICINE
Payer: COMMERCIAL

## 2025-03-19 ENCOUNTER — APPOINTMENT (EMERGENCY)
Dept: CT IMAGING | Facility: HOSPITAL | Age: 55
End: 2025-03-19
Payer: COMMERCIAL

## 2025-03-19 ENCOUNTER — APPOINTMENT (EMERGENCY)
Dept: RADIOLOGY | Facility: HOSPITAL | Age: 55
End: 2025-03-19
Payer: COMMERCIAL

## 2025-03-19 VITALS
TEMPERATURE: 98.7 F | RESPIRATION RATE: 18 BRPM | OXYGEN SATURATION: 98 % | DIASTOLIC BLOOD PRESSURE: 66 MMHG | HEART RATE: 90 BPM | SYSTOLIC BLOOD PRESSURE: 118 MMHG

## 2025-03-19 DIAGNOSIS — M54.2 NECK PAIN: ICD-10-CM
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
## 2025-03-19 DIAGNOSIS — R07.9 CHEST PAIN: ICD-10-CM

## 2025-03-19 LAB
2HR DELTA HS TROPONIN: 0 NG/L
ALBUMIN SERPL BCG-MCNC: 4.4 G/DL (ref 3.5–5)
ALP SERPL-CCNC: 87 U/L (ref 34–104)
ALT SERPL W P-5'-P-CCNC: 19 U/L (ref 7–52)
ANION GAP SERPL CALCULATED.3IONS-SCNC: 6 MMOL/L (ref 4–13)
AST SERPL W P-5'-P-CCNC: 23 U/L (ref 13–39)
BASOPHILS # BLD AUTO: 0.07 THOUSANDS/ÂΜL (ref 0–0.1)
BASOPHILS NFR BLD AUTO: 1 % (ref 0–1)
BILIRUB SERPL-MCNC: 0.33 MG/DL (ref 0.2–1)
BUN SERPL-MCNC: 15 MG/DL (ref 5–25)
CALCIUM SERPL-MCNC: 9.5 MG/DL (ref 8.4–10.2)
CARDIAC TROPONIN I PNL SERPL HS: 13 NG/L (ref ?–50)
CARDIAC TROPONIN I PNL SERPL HS: 13 NG/L (ref ?–50)
CHLORIDE SERPL-SCNC: 108 MMOL/L (ref 96–108)
CO2 SERPL-SCNC: 26 MMOL/L (ref 21–32)
CREAT SERPL-MCNC: 1.05 MG/DL (ref 0.6–1.3)
EOSINOPHIL # BLD AUTO: 0.07 THOUSAND/ÂΜL (ref 0–0.61)
EOSINOPHIL NFR BLD AUTO: 1 % (ref 0–6)
ERYTHROCYTE [DISTWIDTH] IN BLOOD BY AUTOMATED COUNT: 12.4 % (ref 11.6–15.1)
GFR SERPL CREATININE-BSD FRML MDRD: 59 ML/MIN/1.73SQ M
GLUCOSE SERPL-MCNC: 106 MG/DL (ref 65–140)
HCT VFR BLD AUTO: 40.2 % (ref 34.8–46.1)
HGB BLD-MCNC: 13.4 G/DL (ref 11.5–15.4)
IMM GRANULOCYTES # BLD AUTO: 0.03 THOUSAND/UL (ref 0–0.2)
IMM GRANULOCYTES NFR BLD AUTO: 0 % (ref 0–2)
LYMPHOCYTES # BLD AUTO: 1.26 THOUSANDS/ÂΜL (ref 0.6–4.47)
LYMPHOCYTES NFR BLD AUTO: 18 % (ref 14–44)
MCH RBC QN AUTO: 31.4 PG (ref 26.8–34.3)
MCHC RBC AUTO-ENTMCNC: 33.3 G/DL (ref 31.4–37.4)
MCV RBC AUTO: 94 FL (ref 82–98)
MONOCYTES # BLD AUTO: 0.44 THOUSAND/ÂΜL (ref 0.17–1.22)
MONOCYTES NFR BLD AUTO: 6 % (ref 4–12)
NEUTROPHILS # BLD AUTO: 5 THOUSANDS/ÂΜL (ref 1.85–7.62)
NEUTS SEG NFR BLD AUTO: 74 % (ref 43–75)
NRBC BLD AUTO-RTO: 0 /100 WBCS
PLATELET # BLD AUTO: 241 THOUSANDS/UL (ref 149–390)
PMV BLD AUTO: 10 FL (ref 8.9–12.7)
POTASSIUM SERPL-SCNC: 3.9 MMOL/L (ref 3.5–5.3)
PROT SERPL-MCNC: 6.7 G/DL (ref 6.4–8.4)
RBC # BLD AUTO: 4.27 MILLION/UL (ref 3.81–5.12)
SODIUM SERPL-SCNC: 140 MMOL/L (ref 135–147)
WBC # BLD AUTO: 6.87 THOUSAND/UL (ref 4.31–10.16)

## 2025-03-19 PROCEDURE — 74177 CT ABD & PELVIS W/CONTRAST: CPT

## 2025-03-19 PROCEDURE — 85025 COMPLETE CBC W/AUTO DIFF WBC: CPT | Performed by: EMERGENCY MEDICINE

## 2025-03-19 PROCEDURE — 84484 ASSAY OF TROPONIN QUANT: CPT | Performed by: EMERGENCY MEDICINE

## 2025-03-19 PROCEDURE — 73090 X-RAY EXAM OF FOREARM: CPT

## 2025-03-19 PROCEDURE — 99285 EMERGENCY DEPT VISIT HI MDM: CPT | Performed by: EMERGENCY MEDICINE

## 2025-03-19 PROCEDURE — 93005 ELECTROCARDIOGRAM TRACING: CPT

## 2025-03-19 PROCEDURE — 70450 CT HEAD/BRAIN W/O DYE: CPT

## 2025-03-19 PROCEDURE — 72125 CT NECK SPINE W/O DYE: CPT

## 2025-03-19 PROCEDURE — 71260 CT THORAX DX C+: CPT

## 2025-03-19 PROCEDURE — 80053 COMPREHEN METABOLIC PANEL: CPT | Performed by: EMERGENCY MEDICINE

## 2025-03-19 PROCEDURE — 36415 COLL VENOUS BLD VENIPUNCTURE: CPT | Performed by: EMERGENCY MEDICINE

## 2025-03-19 PROCEDURE — 99285 EMERGENCY DEPT VISIT HI MDM: CPT

## 2025-03-19 RX ORDER — MORPHINE SULFATE 4 MG/ML
4 INJECTION, SOLUTION INTRAMUSCULAR; INTRAVENOUS ONCE
Status: DISCONTINUED | OUTPATIENT
Start: 2025-03-19 | End: 2025-03-19

## 2025-03-19 RX ORDER — OXYCODONE AND ACETAMINOPHEN 5; 325 MG/1; MG/1
1 TABLET ORAL EVERY 6 HOURS PRN
Qty: 15 TABLET | Refills: 0 | Status: SHIPPED | OUTPATIENT
Start: 2025-03-19 | End: 2025-03-24

## 2025-03-19 RX ORDER — IBUPROFEN 800 MG/1
800 TABLET, FILM COATED ORAL EVERY 8 HOURS PRN
Qty: 21 TABLET | Refills: 0 | Status: SHIPPED | OUTPATIENT
Start: 2025-03-19

## 2025-03-19 RX ORDER — ACETAMINOPHEN 325 MG/1
650 TABLET ORAL ONCE
Status: COMPLETED | OUTPATIENT
Start: 2025-03-19 | End: 2025-03-19

## 2025-03-19 RX ADMIN — ACETAMINOPHEN 650 MG: 325 TABLET, FILM COATED ORAL at 19:15

## 2025-03-19 RX ADMIN — IOHEXOL 100 ML: 350 INJECTION, SOLUTION INTRAVENOUS at 19:33

## 2025-03-19 NOTE — Clinical Note
Sanjay Meeks was seen and treated in our emergency department on 3/19/2025.                Diagnosis:     Sanjay  may return to work on return date.    She may return on this date: 03/21/2025         If you have any questions or concerns, please don't hesitate to call.      Maria Luisa Andujar MD    ______________________________           _______________          _______________  Hospital Representative                              Date                                Time

## 2025-03-19 NOTE — Clinical Note
Sanjay Meeks was seen and treated in our emergency department on 3/19/2025.                Diagnosis:     Sanjay  may return to work on return date.    She may return on this date: 03/28/2025         If you have any questions or concerns, please don't hesitate to call.      Maria Luisa Andujar MD    ______________________________           _______________          _______________  Hospital Representative                              Date                                Time
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
## 2025-03-20 ENCOUNTER — RESULTS FOLLOW-UP (OUTPATIENT)
Dept: EMERGENCY DEPT | Facility: HOSPITAL | Age: 55
End: 2025-03-20

## 2025-03-20 LAB
ATRIAL RATE: 84 BPM
P AXIS: 78 DEGREES
PR INTERVAL: 148 MS
QRS AXIS: 71 DEGREES
QRSD INTERVAL: 86 MS
QT INTERVAL: 384 MS
QTC INTERVAL: 453 MS
T WAVE AXIS: 69 DEGREES
VENTRICULAR RATE: 84 BPM

## 2025-03-20 PROCEDURE — 93010 ELECTROCARDIOGRAM REPORT: CPT | Performed by: INTERNAL MEDICINE
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
## 2025-03-21 ENCOUNTER — APPOINTMENT (OUTPATIENT)
Dept: RADIOLOGY | Facility: CLINIC | Age: 55
End: 2025-03-21
Payer: COMMERCIAL

## 2025-03-21 ENCOUNTER — OFFICE VISIT (OUTPATIENT)
Age: 55
End: 2025-03-21
Payer: COMMERCIAL

## 2025-03-21 VITALS — WEIGHT: 135 LBS | HEIGHT: 65 IN | BODY MASS INDEX: 22.49 KG/M2

## 2025-03-21 DIAGNOSIS — S52.044A NONDISPLACED FRACTURE OF CORONOID PROCESS OF RIGHT ULNA, INITIAL ENCOUNTER FOR CLOSED FRACTURE: Primary | ICD-10-CM

## 2025-03-21 DIAGNOSIS — M25.521 PAIN IN RIGHT ELBOW: ICD-10-CM

## 2025-03-21 PROCEDURE — 99203 OFFICE O/P NEW LOW 30 MIN: CPT | Performed by: ORTHOPAEDIC SURGERY

## 2025-03-21 PROCEDURE — 73080 X-RAY EXAM OF ELBOW: CPT

## 2025-03-21 RX ORDER — MELOXICAM 15 MG/1
15 TABLET ORAL DAILY PRN
COMMUNITY

## 2025-03-21 RX ORDER — ALBUTEROL SULFATE 0.83 MG/ML
SOLUTION RESPIRATORY (INHALATION) AS NEEDED
COMMUNITY

## 2025-03-21 RX ORDER — DICYCLOMINE HYDROCHLORIDE 10 MG/1
10 CAPSULE ORAL AS NEEDED
COMMUNITY

## 2025-03-21 RX ORDER — HYDROCORTISONE 25 MG/G
1 CREAM TOPICAL 2 TIMES DAILY PRN
COMMUNITY
Start: 2025-01-27 | End: 2026-01-27

## 2025-03-21 RX ORDER — CLONAZEPAM 0.5 MG/1
0.5 TABLET ORAL DAILY
COMMUNITY
Start: 2025-03-13
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
## 2025-03-21 NOTE — LETTER
March 21, 2025     Patient: Sanjay Meeks  YOB: 1970  Date of Visit: 3/21/2025      To Whom it May Concern:    Sanjay Meeks is under my professional care for right elbow fracture. Sanjay was seen in my office on 3/21/2025.    If you have any questions or concerns, please don't hesitate to call.         Sincerely,          Miguel Angel Fitzpatrick MD        CC: No Recipients

## 2025-03-25 ENCOUNTER — APPOINTMENT (OUTPATIENT)
Dept: RADIOLOGY | Facility: CLINIC | Age: 55
End: 2025-03-25
Payer: COMMERCIAL

## 2025-03-25 DIAGNOSIS — S33.5XXA LUMBAR SPRAIN, INITIAL ENCOUNTER: ICD-10-CM

## 2025-03-25 PROCEDURE — 72100 X-RAY EXAM L-S SPINE 2/3 VWS: CPT

## 2025-04-03 ENCOUNTER — EVALUATION (OUTPATIENT)
Facility: CLINIC | Age: 55
End: 2025-04-03
Payer: COMMERCIAL

## 2025-04-03 DIAGNOSIS — H81.90 VESTIBULAR DIZZINESS: ICD-10-CM

## 2025-04-03 DIAGNOSIS — M54.2 CERVICALGIA: ICD-10-CM

## 2025-04-03 DIAGNOSIS — S06.0X9D CONCUSSION WITH LOSS OF CONSCIOUSNESS, SUBSEQUENT ENCOUNTER: Primary | ICD-10-CM

## 2025-04-03 DIAGNOSIS — S33.5XXD LUMBAR SPRAIN, SUBSEQUENT ENCOUNTER: ICD-10-CM

## 2025-04-03 PROCEDURE — 97162 PT EVAL MOD COMPLEX 30 MIN: CPT
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
## 2025-04-10 ENCOUNTER — OFFICE VISIT (OUTPATIENT)
Facility: CLINIC | Age: 55
End: 2025-04-10
Payer: COMMERCIAL

## 2025-04-10 DIAGNOSIS — S06.0X9D CONCUSSION WITH LOSS OF CONSCIOUSNESS, SUBSEQUENT ENCOUNTER: Primary | ICD-10-CM

## 2025-04-10 DIAGNOSIS — S33.5XXD LUMBAR SPRAIN, SUBSEQUENT ENCOUNTER: ICD-10-CM

## 2025-04-10 DIAGNOSIS — M54.2 CERVICALGIA: ICD-10-CM

## 2025-04-10 DIAGNOSIS — H81.90 VESTIBULAR DIZZINESS: ICD-10-CM

## 2025-04-10 PROCEDURE — 97112 NEUROMUSCULAR REEDUCATION: CPT

## 2025-04-10 PROCEDURE — 97140 MANUAL THERAPY 1/> REGIONS: CPT

## 2025-04-10 NOTE — PROGRESS NOTES
Daily Note     Today's date: 4/10/2025  Patient name: Sanjay Meeks  : 1970  MRN: [id number]  Referring provider: Phil Gar DO  Dx:   Encounter Diagnosis     ICD-10-CM    1. Concussion with loss of consciousness, subsequent encounter  S06.0X9D       2. Vestibular dizziness  H81.90       3. Cervicalgia  M54.2       4. Lumbar sprain, subsequent encounter  S33.5XXD           Start Time: 174  Stop Time: 183  Total time in clinic (min): 45 minutes    Subjective: Patient arrives with reports of continued headache, pain in her eyes, increased lower back pain, which she is seeing the doctor for tomorrow, and continued knee pain, for which she will be evaluated by PT next week.       Objective: See treatment diary below      Assessment: Tolerated treatment  fairly, with limited tolerance to manual techniques and increased left sided eye pain more than right with occipital release. Good tolerance for gentle myofascial release, but without resolution of symptoms. In supine, with head rotated to left side, able to abolish right sided eye pain  . Patient demonstrated fatigue post treatment, exhibited good technique with therapeutic exercises, and would benefit from continued PT      Plan: Continue per plan of care.  Progress treatment as tolerated.         Outcome Measures Initial Eval  4/3/25        mCTSIB  - FTEO (firm)  - FTEC (firm)  - FTEO (foam)  - FTEC (foam) Time, sway  >60 sec, min sway  >60 sec, mod sway  >60 sec, mod sway  13.45 sec, mod-max sway, inc sx        FGA TBA/30        MARY 35 Errors        DHI TBA/100        PSSS   102/132                                                 Precautions: falls, concussion, R elbow fracture-no lifting,   Past Medical History:   Diagnosis Date    Cancer (HCC)     20 years ago, Breast          Daily Exercise Log:  Start of Care: 4/3/25  POC expires 25    Date 4/3/25 4/11/25       Visit # 1 2       Insurance Auth  pending 36 units of each dx code        Auth Expires  TBD 5/15/25                Manual  13'       STM to cervical spine  LS-gentle to tolerance, limited tolerance for occipital release, improved kathleen for myofascial work in supine       Manual traction  Gentle in supine, fair tolernace                         Neuro Re-Ed  13'       Slouch correct  10x, reviewed posture, dec strain on neck       Sissel seat stabilization         VORx1         VOR cancel         Neuro testing    FGA      Scap stab w/ shld ext, rows  Seated scap squeeze 2x10       Fwd/retro walk         Tandem walk         Stance EC on foam                                             Ther Ex  5'       Cervical SNAGS rotation and ext         Shoulder flexion  Supine w/ cane, MHP to neck        Shoulder isometrics                                                      Ther Activity                           Gait Training                           Modalities                           HEP:   TM ambulation with 2.0-2.3 mph, 5 min bouts to start on level incline, gradually inc time/speed. Limit symptom increase to 2 points on 10 point scale. Understanding expressed by patient.

## 2025-04-14 ENCOUNTER — OFFICE VISIT (OUTPATIENT)
Facility: CLINIC | Age: 55
End: 2025-04-14
Attending: INTERNAL MEDICINE
Payer: COMMERCIAL

## 2025-04-14 DIAGNOSIS — H81.90 VESTIBULAR DIZZINESS: ICD-10-CM

## 2025-04-14 DIAGNOSIS — M54.2 CERVICALGIA: ICD-10-CM

## 2025-04-14 DIAGNOSIS — S33.5XXD LUMBAR SPRAIN, SUBSEQUENT ENCOUNTER: ICD-10-CM

## 2025-04-14 DIAGNOSIS — S06.0X9D CONCUSSION WITH LOSS OF CONSCIOUSNESS, SUBSEQUENT ENCOUNTER: Primary | ICD-10-CM

## 2025-04-14 PROCEDURE — 97110 THERAPEUTIC EXERCISES: CPT

## 2025-04-14 PROCEDURE — 97140 MANUAL THERAPY 1/> REGIONS: CPT

## 2025-04-14 NOTE — PROGRESS NOTES
Daily Note     Today's date: 2025  Patient name: Sanjay Meeks  : 1970  MRN: [id number]  Referring provider: Phil Gar DO  Dx:   Encounter Diagnosis     ICD-10-CM    1. Concussion with loss of consciousness, subsequent encounter  S06.0X9D       2. Vestibular dizziness  H81.90       3. Cervicalgia  M54.2       4. Lumbar sprain, subsequent encounter  S33.5XXD           Start Time:   Stop Time:   Total time in clinic (min): 46 minutes    Subjective: Patient arrives reporting her headache is virtually gone, with continued pain behind her eyes. Notes her back is hurting a little, but has been better since she has been using heat on it since Thursday when she saw PT previously.       Objective: See treatment diary below      Assessment: Tolerated treatment  fairly well, with mild increased pressure in forehead, but not behind eyes after some exercises performed. Patient tolerated increased activities fairly well, with improving mobility and cervical ROM demonstrated overall. Patient able to note a difference, and demonstrated good understanding of exercises and activities to initiate for home carryover . Patient demonstrated fatigue post treatment, exhibited good technique with therapeutic exercises, and would benefit from continued PT      Plan: Continue per plan of care.  Progress treatment as tolerated.         Outcome Measures Initial Eval  4/3/25        mCTSIB  - FTEO (firm)  - FTEC (firm)  - FTEO (foam)  - FTEC (foam) Time, sway  >60 sec, min sway  >60 sec, mod sway  >60 sec, mod sway  13.45 sec, mod-max sway, inc sx        FGA TBA/30        MARY 35 Errors        DHI TBA/100        PSSS   102/132                                                 Precautions: falls, concussion, R elbow fracture-no lifting,   Past Medical History:   Diagnosis Date    Cancer (HCC)     20 years ago, Breast          Daily Exercise Log:  Start of Care: 4/3/25  POC expires 25  Date 4/3/25 4/11/25  4/14/25      Visit # 1 2 3      Insurance Auth  pending 36 units of each dx code 36 units of each dx code      Auth Expires  TBD 5/15/25 5/15/25               Manual  13' 10'      STM to cervical spine  LS-gentle to tolerance, limited tolerance for occipital release, improved kathleen for myofascial work in supine LS, gentle to tolerance, improved kathleen for occipital release      Manual traction  Gentle in supine, fair tolernace LS, in supine, improved kathleen                        Neuro Re-Ed  13'       Slouch correct  10x, reviewed posture, dec strain on neck       Sissel seat stabilization         VORx1         VOR cancel         Neuro testing     FGA     Scap stab w/ shld ext, rows  Seated scap squeeze 2x10 2x10      Fwd/retro walk         Tandem walk         Stance EC on foam                                             Ther Ex  5' 18'      Cervical SNAGS rotation and ext   Cerv ext 2x10  Rotation 2x10 supine & additional in sitting      Shoulder flexion  Supine w/ cane, MHP to neck  Supine 2x10 active      Sun salute   2x10 supine to tolerance      Shoulder isometrics         Wall slides   2x10, slight inc pain noted                                          Ther Activity                           Gait Training                           Modalities                           HEP:   TM ambulation with 2.0-2.3 mph, 5 min bouts to start on level incline, gradually inc time/speed. Limit symptom increase to 2 points on 10 point scale. Understanding expressed by patient.

## 2025-04-16 ENCOUNTER — EVALUATION (OUTPATIENT)
Dept: PHYSICAL THERAPY | Facility: CLINIC | Age: 55
End: 2025-04-16
Payer: COMMERCIAL

## 2025-04-16 DIAGNOSIS — M25.562 ACUTE PAIN OF BOTH KNEES: Primary | ICD-10-CM

## 2025-04-16 DIAGNOSIS — M25.561 ACUTE PAIN OF BOTH KNEES: Primary | ICD-10-CM

## 2025-04-16 PROCEDURE — 97161 PT EVAL LOW COMPLEX 20 MIN: CPT | Performed by: PHYSICAL MEDICINE & REHABILITATION
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
## 2025-04-18 ENCOUNTER — OFFICE VISIT (OUTPATIENT)
Facility: CLINIC | Age: 55
End: 2025-04-18
Attending: INTERNAL MEDICINE
Payer: COMMERCIAL

## 2025-04-18 DIAGNOSIS — S06.0X9D CONCUSSION WITH LOSS OF CONSCIOUSNESS, SUBSEQUENT ENCOUNTER: Primary | ICD-10-CM

## 2025-04-18 DIAGNOSIS — S33.5XXD LUMBAR SPRAIN, SUBSEQUENT ENCOUNTER: ICD-10-CM

## 2025-04-18 DIAGNOSIS — H81.90 VESTIBULAR DIZZINESS: ICD-10-CM

## 2025-04-18 DIAGNOSIS — M54.2 CERVICALGIA: ICD-10-CM

## 2025-04-18 PROCEDURE — 97140 MANUAL THERAPY 1/> REGIONS: CPT

## 2025-04-18 PROCEDURE — 97112 NEUROMUSCULAR REEDUCATION: CPT

## 2025-04-21 ENCOUNTER — OFFICE VISIT (OUTPATIENT)
Facility: CLINIC | Age: 55
End: 2025-04-21
Attending: INTERNAL MEDICINE
Payer: COMMERCIAL

## 2025-04-21 DIAGNOSIS — H81.90 VESTIBULAR DIZZINESS: ICD-10-CM

## 2025-04-21 DIAGNOSIS — S33.5XXD LUMBAR SPRAIN, SUBSEQUENT ENCOUNTER: ICD-10-CM

## 2025-04-21 DIAGNOSIS — S06.0X9D CONCUSSION WITH LOSS OF CONSCIOUSNESS, SUBSEQUENT ENCOUNTER: Primary | ICD-10-CM

## 2025-04-21 DIAGNOSIS — M54.2 CERVICALGIA: ICD-10-CM

## 2025-04-21 PROCEDURE — 97112 NEUROMUSCULAR REEDUCATION: CPT

## 2025-04-23 ENCOUNTER — OFFICE VISIT (OUTPATIENT)
Facility: CLINIC | Age: 55
End: 2025-04-23
Attending: INTERNAL MEDICINE
Payer: COMMERCIAL

## 2025-04-23 DIAGNOSIS — H81.90 VESTIBULAR DIZZINESS: ICD-10-CM

## 2025-04-23 DIAGNOSIS — S06.0X9D CONCUSSION WITH LOSS OF CONSCIOUSNESS, SUBSEQUENT ENCOUNTER: Primary | ICD-10-CM

## 2025-04-23 PROCEDURE — 97140 MANUAL THERAPY 1/> REGIONS: CPT | Performed by: PHYSICAL THERAPIST

## 2025-04-23 PROCEDURE — 97112 NEUROMUSCULAR REEDUCATION: CPT | Performed by: PHYSICAL THERAPIST

## 2025-04-23 PROCEDURE — 97110 THERAPEUTIC EXERCISES: CPT | Performed by: PHYSICAL THERAPIST
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
## 2025-04-25 ENCOUNTER — APPOINTMENT (OUTPATIENT)
Dept: RADIOLOGY | Facility: CLINIC | Age: 55
End: 2025-04-25
Attending: ORTHOPAEDIC SURGERY
Payer: COMMERCIAL

## 2025-04-25 ENCOUNTER — OFFICE VISIT (OUTPATIENT)
Age: 55
End: 2025-04-25
Payer: COMMERCIAL

## 2025-04-25 DIAGNOSIS — S52.044A NONDISPLACED FRACTURE OF CORONOID PROCESS OF RIGHT ULNA, INITIAL ENCOUNTER FOR CLOSED FRACTURE: ICD-10-CM

## 2025-04-25 DIAGNOSIS — S52.044A NONDISPLACED FRACTURE OF CORONOID PROCESS OF RIGHT ULNA, INITIAL ENCOUNTER FOR CLOSED FRACTURE: Primary | ICD-10-CM

## 2025-04-25 PROCEDURE — 99213 OFFICE O/P EST LOW 20 MIN: CPT | Performed by: ORTHOPAEDIC SURGERY

## 2025-04-25 PROCEDURE — 73080 X-RAY EXAM OF ELBOW: CPT

## 2025-04-25 RX ORDER — ONDANSETRON 4 MG/1
1 TABLET, FILM COATED ORAL DAILY
COMMUNITY
Start: 2025-04-15

## 2025-04-25 RX ORDER — MECLIZINE HYDROCHLORIDE 25 MG/1
25 TABLET ORAL EVERY 8 HOURS PRN
COMMUNITY
Start: 2025-04-11
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
## 2025-04-28 ENCOUNTER — OFFICE VISIT (OUTPATIENT)
Facility: CLINIC | Age: 55
End: 2025-04-28
Attending: INTERNAL MEDICINE
Payer: COMMERCIAL

## 2025-04-28 ENCOUNTER — OFFICE VISIT (OUTPATIENT)
Facility: CLINIC | Age: 55
End: 2025-04-28
Attending: ORTHOPAEDIC SURGERY
Payer: COMMERCIAL

## 2025-04-28 DIAGNOSIS — S33.5XXD LUMBAR SPRAIN, SUBSEQUENT ENCOUNTER: ICD-10-CM

## 2025-04-28 DIAGNOSIS — M25.561 ACUTE PAIN OF BOTH KNEES: Primary | ICD-10-CM

## 2025-04-28 DIAGNOSIS — S06.0X9D CONCUSSION WITH LOSS OF CONSCIOUSNESS, SUBSEQUENT ENCOUNTER: Primary | ICD-10-CM

## 2025-04-28 DIAGNOSIS — M25.562 ACUTE PAIN OF BOTH KNEES: Primary | ICD-10-CM

## 2025-04-28 DIAGNOSIS — M54.2 CERVICALGIA: ICD-10-CM

## 2025-04-28 DIAGNOSIS — H81.90 VESTIBULAR DIZZINESS: ICD-10-CM

## 2025-04-28 PROCEDURE — 97110 THERAPEUTIC EXERCISES: CPT

## 2025-04-28 PROCEDURE — 97112 NEUROMUSCULAR REEDUCATION: CPT

## 2025-04-29 NOTE — PROGRESS NOTES
"Daily Note     Today's date: 2025  Patient name: Sanjay Meeks  : 1970  MRN: [id number]  Referring provider: Flaquito Garza MD  Dx:   Encounter Diagnosis     ICD-10-CM    1. Acute pain of both knees  M25.561     M25.562           Start Time:   Stop Time: 1830  Total time in clinic (min): 15 minutes    Subjective: Patient arrives reporting her knee pain is still there, but gradually decreasing.       Objective: See treatment diary below      Assessment: Tolerated treatment well and without worsening symptoms during session. Patient demonstrated good understanding of exercises and motivated to perform at home between sessions . Patient demonstrated fatigue post treatment, exhibited good technique with therapeutic exercises, and would benefit from continued PT      Plan: Continue per plan of care.  Progress treatment as tolerated.       Precautions:   Past Medical History:   Diagnosis Date    Cancer (HCC)     20 years ago, Breast         Date: 2025      Visit #/  authorized        Auth exp date        FOTO:                Manuals        Mechanical assessment                                Neuro Re-Ed                                                                Ther Ex  10'      bridging  2x10      Lower trunk rotation  10x 5\" hold each side      SLR  10x each      Prone on elbows                                        Ther Activity                        Gait Training                        Modalities                                   "
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
## 2025-05-01 ENCOUNTER — OFFICE VISIT (OUTPATIENT)
Facility: CLINIC | Age: 55
End: 2025-05-01
Attending: INTERNAL MEDICINE
Payer: COMMERCIAL

## 2025-05-01 ENCOUNTER — OFFICE VISIT (OUTPATIENT)
Facility: CLINIC | Age: 55
End: 2025-05-01
Attending: ORTHOPAEDIC SURGERY
Payer: COMMERCIAL

## 2025-05-01 DIAGNOSIS — S33.5XXD LUMBAR SPRAIN, SUBSEQUENT ENCOUNTER: ICD-10-CM

## 2025-05-01 DIAGNOSIS — S06.0X9D CONCUSSION WITH LOSS OF CONSCIOUSNESS, SUBSEQUENT ENCOUNTER: Primary | ICD-10-CM

## 2025-05-01 DIAGNOSIS — M25.562 ACUTE PAIN OF BOTH KNEES: Primary | ICD-10-CM

## 2025-05-01 DIAGNOSIS — H81.90 VESTIBULAR DIZZINESS: ICD-10-CM

## 2025-05-01 DIAGNOSIS — M25.561 ACUTE PAIN OF BOTH KNEES: Primary | ICD-10-CM

## 2025-05-01 DIAGNOSIS — M54.2 CERVICALGIA: ICD-10-CM

## 2025-05-01 PROCEDURE — 97112 NEUROMUSCULAR REEDUCATION: CPT

## 2025-05-01 PROCEDURE — 97140 MANUAL THERAPY 1/> REGIONS: CPT

## 2025-05-01 PROCEDURE — 97110 THERAPEUTIC EXERCISES: CPT

## 2025-05-01 NOTE — PROGRESS NOTES
Daily Note     Today's date: 2025  Patient name: Sanjay Meeks  : 1970  MRN: [id number]  Referring provider: Phil Gar DO  Dx:   Encounter Diagnosis     ICD-10-CM    1. Concussion with loss of consciousness, subsequent encounter  S06.0X9D       2. Cervicalgia  M54.2       3. Vestibular dizziness  H81.90       4. Lumbar sprain, subsequent encounter  S33.5XXD           Start Time: 1700  Stop Time: 1725  Total time in clinic (min): 25 minutes    Subjective: Patient reported she was feeling good until yesterday when she bent over in [employer] and almost passed out. Headache has returned, just in the front part of her head, with primary sensation of pressure.       Objective: See treatment diary below      Assessment: Tolerated treatment  fairly well, with negative BPPV testing demonstrated with Frenzel lenses. Patient will benefit from continued PT to address concussion and lower back pain . Patient demonstrated fatigue post treatment, exhibited good technique with therapeutic exercises, and would benefit from continued PT      Plan: Continue per plan of care.  Progress treatment as tolerated.       Precautions:   Past Medical History:   Diagnosis Date    Cancer (HCC)     20 years ago, Breast         Daily Exercise Log:  Start of Care: 4/3/25  POC expires 25  Date 25   Visit # 7 8    6   Insurance Auth 36 units of ea code        Auth Expires 5/15/25                 Manual 10' 10'       STM to cervical spine Suboccipital release, gentle STM to upper cerv mm Occipital release, STM to scalenes and upper traps    Supine suboccipital release   Manual traction Cervical traction, also with additional of lateral bends w/ traction Supine and with mild lateral bends including    Supine, gentle cervical distraction                     Neuro Re-Ed 18' 15'       Slouch correct 15x        Sissel seat stabilization         Stance on foam         BPPV assessment  With frenzels, negative for  "post canal R/L, with some inc pressure upon return to sitting noted        Blazepods         VORx1         VOR cancel 3x30\"        Neuro testing          Scap stab w/ shld ext, rows Supine shld flx 20x  S/L shld abd and open books 20x each R/L     Scap squeeze x10   Fwd/retro walk         Tandem walk      On aeromat x10   Stance EC on foam         Sidestepping       On aeromat x10                              Ther Ex         Cervical SNAGS rotation and ext         Shoulder flexion         Sun salute         Shoulder isometrics         Wall slides         LTR      20x 5 sec holds                              Ther Activity                           Gait Training                           Modalities                           HEP:   TM ambulation with 2.0-2.3 mph, 5 min bouts to start on level incline, gradually inc time/speed. Limit symptom increase to 2 points on 10 point scale. Understanding expressed by patient.                "

## 2025-05-02 NOTE — PROGRESS NOTES
"Daily Note     Today's date: 2025  Patient name: Sanjay Meeks  : 1970  MRN: [id number]  Referring provider: Flaquito Garza MD  Dx:   Encounter Diagnosis     ICD-10-CM    1. Acute pain of both knees  M25.561     M25.562                      Subjective: Patient arrives reporting her knee pain is still there, but gradually decreasing.       Objective: See treatment diary below      Assessment: Tolerated treatment well and with improving overall knee pain noted. Did have one instance of lower back pain during bridges today, which resolved quickly . Patient demonstrated fatigue post treatment, exhibited good technique with therapeutic exercises, and would benefit from continued PT to address LE strength and pain for return to prior level of function.      Plan: Continue per plan of care.  Progress treatment as tolerated.       Precautions:   Past Medical History:   Diagnosis Date    Cancer (HCC)     20 years ago, Breast         Date: 2025     Visit #/  authorized 1 2 3     Auth exp date        FOTO:                Manuals        Mechanical assessment                                Neuro Re-Ed                                                                Ther Ex  10' 12'     bridging  2x10 2x15     Lower trunk rotation  10x 5\" hold each side 10x R/L     SLR  10x each 2x10 R/L     Prone on elbows        HS stretch   30\"x3 R/L                             Ther Activity                        Gait Training                        Modalities                                   "

## 2025-05-05 ENCOUNTER — OFFICE VISIT (OUTPATIENT)
Facility: CLINIC | Age: 55
End: 2025-05-05
Attending: ORTHOPAEDIC SURGERY
Payer: COMMERCIAL

## 2025-05-05 ENCOUNTER — OFFICE VISIT (OUTPATIENT)
Facility: CLINIC | Age: 55
End: 2025-05-05
Attending: INTERNAL MEDICINE
Payer: COMMERCIAL

## 2025-05-05 DIAGNOSIS — S33.5XXD LUMBAR SPRAIN, SUBSEQUENT ENCOUNTER: Primary | ICD-10-CM

## 2025-05-05 DIAGNOSIS — M25.562 ACUTE PAIN OF BOTH KNEES: Primary | ICD-10-CM

## 2025-05-05 DIAGNOSIS — M54.2 CERVICALGIA: ICD-10-CM

## 2025-05-05 DIAGNOSIS — S06.0X9D CONCUSSION WITH LOSS OF CONSCIOUSNESS, SUBSEQUENT ENCOUNTER: ICD-10-CM

## 2025-05-05 DIAGNOSIS — H81.90 VESTIBULAR DIZZINESS: ICD-10-CM

## 2025-05-05 DIAGNOSIS — M25.561 ACUTE PAIN OF BOTH KNEES: Primary | ICD-10-CM

## 2025-05-05 PROCEDURE — 97140 MANUAL THERAPY 1/> REGIONS: CPT

## 2025-05-05 PROCEDURE — 97110 THERAPEUTIC EXERCISES: CPT

## 2025-05-07 NOTE — PROGRESS NOTES
"Daily Note     Today's date: 2025  Patient name: Sanjay Meeks  : 1970  MRN: [id number]  Referring provider: Flaquito Garza MD  Dx:   Encounter Diagnosis     ICD-10-CM    1. Acute pain of both knees  M25.561     M25.562           Start Time: 1745  Stop Time: 1800  Total time in clinic (min): 15 minutes    Subjective: Patient arrives reporting she is feeling good. Has some back and knee pain today, but not very bad. Everything overall is getting better.      Objective: See treatment diary below      Assessment: Tolerated treatment well and with improving overall knee pain noted. Patient demonstrating improving tolerance for LE strengthening exercises with decreasing pain reported and demonstrated . Patient demonstrated fatigue post treatment, exhibited good technique with therapeutic exercises, and would benefit from continued PT to address LE strength and pain for return to prior level of function.      Plan: Continue per plan of care.  Progress treatment as tolerated.       Precautions:   Past Medical History:   Diagnosis Date    Cancer (HCC)     20 years ago, Breast         Date: 2025    Visit #/  authorized 1 2 3 4    Auth exp date        FOTO:                Manuals        Mechanical assessment                                Neuro Re-Ed                                                                Ther Ex  10' 12' 12'    bridging  2x10 2x15 2x10    Lower trunk rotation  10x 5\" hold each side 10x R/L     SLR  10x each 2x10 R/L 2x10 flx & abd R/L     Prone on elbows        HS stretch   30\"x3 R/L 3x30\"    Gastroc stretch    3x30\"                    Ther Activity                        Gait Training                        Modalities                                   "

## 2025-05-07 NOTE — PROGRESS NOTES
Daily Note     Today's date: 2025  Patient name: Sanjay Meeks  : 1970  MRN: [id number]  Referring provider: Phil Gar DO  Dx:   Encounter Diagnosis     ICD-10-CM    1. Lumbar sprain, subsequent encounter  S33.5XXD       2. Concussion with loss of consciousness, subsequent encounter  S06.0X9D           Start Time: 1800  Stop Time: 1830  Total time in clinic (min): 30 minutes    Subjective: Patient arrives reporting she has had no headache for the past three days. She has been having pain in her back, but overall doing much better.       Objective: See treatment diary below      Assessment: Tolerated treatment  well, with improving functional tolerance. No symptoms noted with VOR cancel and walking with head turns today. Some discomfort with lumbar extension, with resolution when stopped. Pt notes continued overall improvements and is pleased with her progress overall since start of care . Patient demonstrated fatigue post treatment, exhibited good technique with therapeutic exercises, and would benefit from continued PT      Plan: Continue per plan of care.  Progress treatment as tolerated.       Precautions:   Past Medical History:   Diagnosis Date    Cancer (HCC)     20 years ago, Breast       Date 25   Visit # 7 8 9   6   Insurance Auth 36 units of ea code        Auth Expires 5/15/25                 Manual 10' 10' 10'      STM to cervical spine Suboccipital release, gentle STM to upper cerv mm Occipital release, STM to scalenes and upper traps Occipital release   Supine suboccipital release   Manual traction Cervical traction, also with additional of lateral bends w/ traction Supine and with mild lateral bends  Supine w/ varied directions   Supine, gentle cervical distraction                     Neuro Re-Ed 18' 15' 3'      Slouch correct 15x        Sissel seat stabilization         Stance on foam         BPPV assessment  With shyann, negative for post canal R/L,  "with some inc pressure upon return to sitting noted        Blazepods         VORx1         VOR cancel 3x30\"  On foam, 2x10      Neuro testing          Scap stab w/ shld ext, rows Supine shld flx 20x  S/L shld abd and open books 20x each R/L     Scap squeeze x10   Fwd/retro walk         Tandem walk      On aeromat x10   Stance EC on foam         Sidestepping       On aeromat x10                              Ther Ex   13      Cervical SNAGS rotation and ext         Shoulder flexion   Std w/ back to wall      Sun salute   15x w/ back to wall      Shoulder isometrics         Wall slides   10x 5\" hold      LTR   10x   20x 5 sec holds   Standing lumbar extension at counter   2x10                        Ther Activity                           Gait Training                           Modalities                           HEP:   TM ambulation with 2.0-2.3 mph, 5 min bouts to start on level incline, gradually inc time/speed. Limit symptom increase to 2 points on 10 point scale. Understanding expressed by patient.              "

## 2025-05-08 ENCOUNTER — OFFICE VISIT (OUTPATIENT)
Facility: CLINIC | Age: 55
End: 2025-05-08
Attending: INTERNAL MEDICINE
Payer: COMMERCIAL

## 2025-05-08 DIAGNOSIS — S33.5XXD LUMBAR SPRAIN, SUBSEQUENT ENCOUNTER: Primary | ICD-10-CM

## 2025-05-08 DIAGNOSIS — H81.90 VESTIBULAR DIZZINESS: ICD-10-CM

## 2025-05-08 DIAGNOSIS — M54.2 CERVICALGIA: ICD-10-CM

## 2025-05-08 DIAGNOSIS — S06.0X9D CONCUSSION WITH LOSS OF CONSCIOUSNESS, SUBSEQUENT ENCOUNTER: ICD-10-CM

## 2025-05-08 PROCEDURE — 97112 NEUROMUSCULAR REEDUCATION: CPT

## 2025-05-08 PROCEDURE — 97110 THERAPEUTIC EXERCISES: CPT

## 2025-05-08 PROCEDURE — 97140 MANUAL THERAPY 1/> REGIONS: CPT

## 2025-05-08 NOTE — PROGRESS NOTES
Daily Note     Today's date: 2025  Patient name: Sanjay Meeks  : 1970  MRN: [id number]  Referring provider: Phil Gar DO  Dx:   Encounter Diagnosis     ICD-10-CM    1. Lumbar sprain, subsequent encounter  S33.5XXD       2. Vestibular dizziness  H81.90       3. Concussion with loss of consciousness, subsequent encounter  S06.0X9D       4. Cervicalgia  M54.2           Start Time: 1615  Stop Time: 1700  Total time in clinic (min): 45 minutes    Subjective: Patient arrives reporting mild pain in shoulder and neck, but overall feeling pretty good. Fourth day in a row feeling pretty good.        Objective: See treatment diary below      Assessment: Tolerated treatment fairly well and with continued lower back ache/pain noted intermittently. Continues to have lower back pain when sitting for >15 min, but standing and walking feel good. Patient reports near resolution of knee pain at this time, with concussion symptoms also nearly resolved . Patient demonstrated fatigue post treatment, exhibited good technique with therapeutic exercises, and would benefit from continued PT.      Plan: Continue per plan of care.  Progress treatment as tolerated.  No significant improvement noted in lower back pain. Pt may benefit from additional medical work-up.      Precautions:   Past Medical History:   Diagnosis Date    Cancer (HCC)     20 years ago, Breast       Date 25     Visit # 7 8 9 10     Insurance Auth 36 units of ea code        Auth Expires 5/15/25                 Manual 10' 10' 10' 15'     STM to cervical spine Suboccipital release, gentle STM to upper cerv mm Occipital release, STM to scalenes and upper traps Occipital release Occipital release, gentle PA mobs to lumbar spine     Manual traction Cervical traction, also with additional of lateral bends w/ traction Supine and with mild lateral bends  Supine w/ varied directions Supine lower limb traction B/L    Supine cervical also    "                    Neuro Re-Ed 18' 15' 3' 10'     Slouch correct 15x        Sissel seat stabilization         Stance on foam    Horiz & vert HT, EC     Single leg stance    EC R/L x 5 trials     BPPV assessment  With shyann, negative for post canal R/L, with some inc pressure upon return to sitting noted        Blazepods         VORx1         VOR cancel 3x30\"  On foam, 2x10 On foam 2x30\"     Neuro testing          Scap stab w/ shld ext, rows Supine shld flx 20x  S/L shld abd and open books 20x each R/L        Fwd/retro walk         Tandem walk    Near counter 4 laps     Stance EC on foam         Sidestepping                                     Ther Ex   13 15'     Cervical SNAGS rotation and ext         Shoulder flexion   Std w/ back to wall      Sun salute   15x w/ back to wall      Shoulder isometrics         Wall slides   10x 5\" hold      LTR   10x 10x     Standing lumbar extension at counter   2x10      Prone on elbows    5'     ANN-MARIE w/ knee flx    20x     SLR w/ core stab    2x10 R/L              Ther Activity                           Gait Training                           Modalities                           HEP:   TM ambulation with 2.0-2.3 mph, 5 min bouts to start on level incline, gradually inc time/speed. Limit symptom increase to 2 points on 10 point scale. Understanding expressed by patient.                "

## 2025-05-14 ENCOUNTER — EVALUATION (OUTPATIENT)
Facility: CLINIC | Age: 55
End: 2025-05-14
Attending: ORTHOPAEDIC SURGERY
Payer: COMMERCIAL

## 2025-05-14 ENCOUNTER — EVALUATION (OUTPATIENT)
Facility: CLINIC | Age: 55
End: 2025-05-14
Attending: INTERNAL MEDICINE
Payer: COMMERCIAL

## 2025-05-14 DIAGNOSIS — H81.90 VESTIBULAR DIZZINESS: ICD-10-CM

## 2025-05-14 DIAGNOSIS — M25.562 ACUTE PAIN OF BOTH KNEES: Primary | ICD-10-CM

## 2025-05-14 DIAGNOSIS — S33.5XXD LUMBAR SPRAIN, SUBSEQUENT ENCOUNTER: Primary | ICD-10-CM

## 2025-05-14 DIAGNOSIS — M54.2 CERVICALGIA: ICD-10-CM

## 2025-05-14 DIAGNOSIS — S06.0X9D CONCUSSION WITH LOSS OF CONSCIOUSNESS, SUBSEQUENT ENCOUNTER: ICD-10-CM

## 2025-05-14 DIAGNOSIS — M25.561 ACUTE PAIN OF BOTH KNEES: Primary | ICD-10-CM

## 2025-05-14 PROCEDURE — 97110 THERAPEUTIC EXERCISES: CPT

## 2025-05-14 PROCEDURE — 97112 NEUROMUSCULAR REEDUCATION: CPT
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
## 2025-05-19 ENCOUNTER — APPOINTMENT (OUTPATIENT)
Facility: CLINIC | Age: 55
End: 2025-05-19
Attending: INTERNAL MEDICINE
Payer: COMMERCIAL

## 2025-05-19 ENCOUNTER — APPOINTMENT (OUTPATIENT)
Facility: CLINIC | Age: 55
End: 2025-05-19
Attending: ORTHOPAEDIC SURGERY
Payer: COMMERCIAL

## 2025-05-22 ENCOUNTER — OFFICE VISIT (OUTPATIENT)
Facility: CLINIC | Age: 55
End: 2025-05-22
Attending: INTERNAL MEDICINE
Payer: COMMERCIAL

## 2025-05-22 DIAGNOSIS — S33.5XXD LUMBAR SPRAIN, SUBSEQUENT ENCOUNTER: Primary | ICD-10-CM

## 2025-05-22 DIAGNOSIS — S06.0X9D CONCUSSION WITH LOSS OF CONSCIOUSNESS, SUBSEQUENT ENCOUNTER: ICD-10-CM

## 2025-05-22 DIAGNOSIS — M54.2 CERVICALGIA: ICD-10-CM

## 2025-05-22 DIAGNOSIS — H81.90 VESTIBULAR DIZZINESS: ICD-10-CM

## 2025-05-22 PROCEDURE — 97110 THERAPEUTIC EXERCISES: CPT

## 2025-05-22 PROCEDURE — 97112 NEUROMUSCULAR REEDUCATION: CPT

## 2025-05-22 PROCEDURE — 97140 MANUAL THERAPY 1/> REGIONS: CPT

## 2025-05-22 NOTE — PROGRESS NOTES
Daily Note     Today's date: 2025  Patient name: Sanjay Meeks  : 1970  MRN: [id number]  Referring provider: Phil Gar DO  Dx:   Encounter Diagnosis     ICD-10-CM    1. Lumbar sprain, subsequent encounter  S33.5XXD       2. Vestibular dizziness  H81.90       3. Concussion with loss of consciousness, subsequent encounter  S06.0X9D       4. Cervicalgia  M54.2           Start Time: 174  Stop Time: 183  Total time in clinic (min): 45 minutes    Subjective: Patient arrives reporting she has not had a headache in two weeks. Overall, concussion symptoms are significantly improved. Continued pain in central lumbar spine over spinous processes, with tenderness to palpation still reported.        Objective: See treatment diary below      Assessment: Tolerated treatment fairly well and with mild improvements in back pain by end of session. Reviewed continuation of stretching and gentle strengthening, with pt in agreement with this plan moving forward. Patient demonstrated fatigue post treatment, exhibited good technique with therapeutic exercises, and would benefit from continued PT      Plan: Continue per plan of care.  Progress treatment as tolerated.       Precautions:   Past Medical History:   Diagnosis Date    Cancer (HCC)     20 years ago, Breast         Outcome Measures Initial Eval  4/3/25 RE  25       mCTSIB  - FTEO (firm)  - FTEC (firm)  - FTEO (foam)  - FTEC (foam) Time, sway  >60 sec, min sway  >60 sec, mod sway  >60 sec, mod sway  13.45 sec, mod-max sway, inc sx   >60 sec, nil  >60 sec min sway  >60 sec, min sway  >60 sec min sway       FGA TBA/30        MARY 35 Errors 19 errors       DHI TBA/100 6 /100       PSSS   102/132   32/132                                                Precautions: falls, concussion, R elbow fracture-no lifting,   Past Medical History:   Diagnosis Date    Cancer (HCC)     20 years ago, Breast          Daily Exercise Log:  Start of Care: 4/3/25  POC  "expires 6/26/25    Date 4/28/25 5/1/25 5/5/24 5/8/25 5/14/25 5/22/25   Visit # 7 8 9 10 11 (RE) 12   Insurance Auth 36 units of ea code        Auth Expires 5/15/25                 Manual 10' 10' 10' 15'  6'   STM to cervical spine Suboccipital release, gentle STM to upper cerv mm Occipital release, STM to scalenes and upper traps Occipital release Occipital release, gentle PA mobs to lumbar spine  Lumbar STM to paraspinals, gentle to moderate pressure utilized based on pt feedback   Manual traction Cervical traction, also with additional of lateral bends w/ traction Supine and with mild lateral bends  Supine w/ varied directions Supine lower limb traction B/L    Supine cervical also  B/L LE limb traction intermittent pressure to tolerance                     Neuro Re-Ed 18' 15' 3' 10' 20' 10'   Slouch correct 15x     10x   Sissel seat stabilization         Stance on foam    Horiz & vert HT, EC     Single leg stance    EC R/L x 5 trials     BPPV assessment  With frenzels, negative for post canal R/L, with some inc pressure upon return to sitting noted        Blatamirpoterrence         VORx1         VOR cancel 3x30\"  On foam, 2x10 On foam 2x30\"     Neuro testing          Scap stab w/ shld ext, rows Supine shld flx 20x  S/L shld abd and open books 20x each R/L        Fwd/retro walk         Tandem walk    Near counter 4 laps     Stance EC on foam         Sidestepping          Retesting- see above     LS                      Ther Ex   13 15' 10' 20'   Cervical SNAGS rotation and ext         Shoulder flexion   Std w/ back to wall      Sun salute   15x w/ back to wall      bridging      2x10 w/ core stab   SKTC      10x R/L   DKTC      2x10 B/L   Shoulder isometrics         Wall slides   10x 5\" hold      LTR   10x 10x  10x 5\" hold R/L   Standing lumbar extension at counter   2x10  Lumbar and cervical assessment Continued lumbar assessment/ discussion of symptoms   Prone on elbows    5'     ANN-MARIE w/ knee flx    20x     SLR w/ core " stab    2x10 R/L           15x R/L   Ther Activity                           Gait Training                           Modalities                           HEP:   TM ambulation with 2.0-2.3 mph, 5 min bouts to start on level incline, gradually inc time/speed. Limit symptom increase to 2 points on 10 point scale. Understanding expressed by patient.

## 2025-05-29 ENCOUNTER — OFFICE VISIT (OUTPATIENT)
Dept: PHYSICAL THERAPY | Facility: CLINIC | Age: 55
End: 2025-05-29
Attending: INTERNAL MEDICINE
Payer: COMMERCIAL

## 2025-05-29 DIAGNOSIS — M25.561 ACUTE PAIN OF BOTH KNEES: Primary | ICD-10-CM

## 2025-05-29 DIAGNOSIS — H81.90 VESTIBULAR DIZZINESS: ICD-10-CM

## 2025-05-29 DIAGNOSIS — S33.5XXD LUMBAR SPRAIN, SUBSEQUENT ENCOUNTER: ICD-10-CM

## 2025-05-29 DIAGNOSIS — S06.0X9D CONCUSSION WITH LOSS OF CONSCIOUSNESS, SUBSEQUENT ENCOUNTER: ICD-10-CM

## 2025-05-29 DIAGNOSIS — M25.562 ACUTE PAIN OF BOTH KNEES: Primary | ICD-10-CM

## 2025-05-29 PROCEDURE — 97112 NEUROMUSCULAR REEDUCATION: CPT

## 2025-05-29 PROCEDURE — 97535 SELF CARE MNGMENT TRAINING: CPT

## 2025-05-29 PROCEDURE — 97110 THERAPEUTIC EXERCISES: CPT

## 2025-05-29 NOTE — PROGRESS NOTES
Daily Note     Today's date: 2025  Patient name: Sanjay Meeks  : 1970  MRN: [id number]  Referring provider: Phil Gar DO  Dx:   Encounter Diagnosis     ICD-10-CM    1. Acute pain of both knees  M25.561     M25.562       2. Lumbar sprain, subsequent encounter  S33.5XXD       3. Vestibular dizziness  H81.90       4. Concussion with loss of consciousness, subsequent encounter  S06.0X9D                      Subjective: stiffness and soreness in spine       Objective: See treatment diary below      Assessment: Tolerated treatment well. Patient demonstrated fatigue post treatment.  Tightness moderate in subocciptals and cervical paraspinals.  Extensive education on neutrla spine strengthening and ROM exericses in neutral spine.      Plan: Continue per plan of care.      Precautions:   Past Medical History:   Diagnosis Date    Cancer (HCC)     20 years ago, Breast         Outcome Measures Initial Eval  4/3/25 RE  25       mCTSIB  - FTEO (firm)  - FTEC (firm)  - FTEO (foam)  - FTEC (foam) Time, sway  >60 sec, min sway  >60 sec, mod sway  >60 sec, mod sway  13.45 sec, mod-max sway, inc sx   >60 sec, nil  >60 sec min sway  >60 sec, min sway  >60 sec min sway       FGA TBA/30        MARY 35 Errors 19 errors       DHI TBA/100 6 /100       PSSS   102/132   32/132                                                Precautions: falls, concussion, R elbow fracture-no lifting,   Past Medical History:   Diagnosis Date    Cancer (HCC)     20 years ago, Breast          Daily Exercise Log:  Start of Care: 4/3/25  POC expires 25    Date 25   Visit # 7 8 9 10 11 (RE) 12   Insurance Auth 36 units of ea code        Auth Expires 5/15/25                 Manual 10' 10' 10' 15'  6'   STM to cervical spine Suboccipital release, gentle STM to upper cerv mm Occipital release, STM to scalenes and upper traps Occipital release Occipital release, gentle PA mobs to lumbar  "spine  Lumbar STM to paraspinals, gentle to moderate pressure utilized based on pt feedback   Manual traction Cervical traction, also with additional of lateral bends w/ traction Supine and with mild lateral bends  Supine w/ varied directions Supine lower limb traction B/L    Supine cervical also  B/L LE limb traction intermittent pressure to tolerance                     Neuro Re-Ed 18' 15' 3' 10' 20' 10'   Slouch correct 15x     10x   Sissel seat stabilization         Stance on foam    Horiz & vert HT, EC     Single leg stance    EC R/L x 5 trials     BPPV assessment  With shyann, negative for post canal R/L, with some inc pressure upon return to sitting noted        Blazepods         VORx1         VOR cancel 3x30\"  On foam, 2x10 On foam 2x30\"     Neuro testing          Scap stab w/ shld ext, rows Supine shld flx 20x  S/L shld abd and open books 20x each R/L        Fwd/retro walk         Tandem walk    Near counter 4 laps     Stance EC on foam         Sidestepping   -->        Retesting- see above     LS                      Ther Ex   13 15' 10' 20'   Cervical SNAGS rotation and ext         Shoulder flexion  Supine  x 12  2#  ea  Std w/ back to wall      Sun salute   15x w/ back to wall      bridging x15     2x10 w/ core stab   SKTC      10x R/L   DKTC      2x10 B/L   Open books  x10        Wall slides   10x 5\" hold      LTR 5 x  10x 10x  10x 5\" hold R/L   Standing lumbar extension at counter   2x10  Lumbar and cervical assessment Continued lumbar assessment/ discussion of symptoms   Prone on elbows    5'     ANN-MARIE w/ knee flx    20x     SLR w/ core stab  X10 ea    2x10 R/L           15x R/L   Ther Activity Ergonomic education x10,neutral spine                           Gait Training                           Modalities                           HEP:   TM ambulation with 2.0-2.3 mph, 5 min bouts to start on level incline, gradually inc time/speed. Limit symptom increase to 2 points on 10 point scale. Understanding " expressed by patient.

## 2025-06-03 ENCOUNTER — APPOINTMENT (OUTPATIENT)
Dept: PHYSICAL THERAPY | Facility: CLINIC | Age: 55
End: 2025-06-03
Attending: ORTHOPAEDIC SURGERY
Payer: COMMERCIAL

## 2025-06-03 ENCOUNTER — APPOINTMENT (OUTPATIENT)
Dept: PHYSICAL THERAPY | Facility: CLINIC | Age: 55
End: 2025-06-03
Attending: INTERNAL MEDICINE
Payer: COMMERCIAL

## 2025-06-05 ENCOUNTER — OFFICE VISIT (OUTPATIENT)
Facility: CLINIC | Age: 55
End: 2025-06-05
Attending: INTERNAL MEDICINE
Payer: COMMERCIAL

## 2025-06-05 DIAGNOSIS — S33.5XXD LUMBAR SPRAIN, SUBSEQUENT ENCOUNTER: ICD-10-CM

## 2025-06-05 DIAGNOSIS — M54.2 CERVICALGIA: ICD-10-CM

## 2025-06-05 DIAGNOSIS — M25.561 ACUTE PAIN OF BOTH KNEES: Primary | ICD-10-CM

## 2025-06-05 DIAGNOSIS — M25.562 ACUTE PAIN OF BOTH KNEES: Primary | ICD-10-CM

## 2025-06-05 PROCEDURE — 97112 NEUROMUSCULAR REEDUCATION: CPT

## 2025-06-05 NOTE — PROGRESS NOTES
Daily Note     Today's date: 2025  Patient name: Sanjay Meeks  : 1970  MRN: [id number]  Referring provider: Phil Gar DO  Dx:   Encounter Diagnosis     ICD-10-CM    1. Acute pain of both knees  M25.561     M25.562       2. Cervicalgia  M54.2       3. Lumbar sprain, subsequent encounter  S33.5XXD           Start Time: 1730  Stop Time: 1805  Total time in clinic (min): 35 minutes    Subjective: Patient arrives with       Objective: See treatment diary below      Assessment: Tolerated treatment well and with significant back pain relief with vertical towel roll, with pt demonstrating good understanding for carryover at home. Patient will benefit from continued core stabilization and general strengthening to decrease overall symptoms. Patient demonstrated fatigue post treatment, exhibited good technique with therapeutic exercises, and would benefit from continued PT      Plan: Continue per plan of care.  Progress treatment as tolerated.       Precautions:   Past Medical History:   Diagnosis Date    Cancer (HCC)     20 years ago, Breast         Outcome Measures Initial Eval  4/3/25 RE  25       mCTSIB  - FTEO (firm)  - FTEC (firm)  - FTEO (foam)  - FTEC (foam) Time, sway  >60 sec, min sway  >60 sec, mod sway  >60 sec, mod sway  13.45 sec, mod-max sway, inc sx   >60 sec, nil  >60 sec min sway  >60 sec, min sway  >60 sec min sway       FGA TBA/30        MARY 35 Errors 19 errors       DHI TBA/100 6 /100       PSSS   102/132   32/132                                                Precautions: falls, concussion, R elbow fracture-no lifting,   Past Medical History:   Diagnosis Date    Cancer (HCC)     20 years ago, Breast          Daily Exercise Log:  Start of Care: 4/3/25  POC expires 25    Date 25   Visit # 13 14    12   Insurance Auth 36 units of ea code        Auth Expires 25                 Manual 10'     6'   STM to cervical spine Suboccipital release,  "gentle STM to upper cerv mm     Lumbar STM to paraspinals, gentle to moderate pressure utilized based on pt feedback   Manual traction Cervical traction, also with additional of lateral bends w/ traction     B/L LE limb traction intermittent pressure to tolerance                     Neuro Re-Ed 18' 23'    10'   Slouch correct 15x Assessed tolerance for mobility, poor tolerance to horiz towel roll, sig improvement in back pain w/ vertical roll     10x   Sissel seat stabilization  Stabilization in sitting (no sissel): alt hip flx, hip add on ball w/ shld flx w/ pilates ring  Slight rotation w/ pilates ring add  Shld ER into pilates ring       Stance on foam         Single leg stance         BPPV assessment          Blazepods         VORx1         VOR cancel 3x30\"        Neuro testing          Scap stab w/ shld ext, rows Supine shld flx 20x  S/L shld abd and open books 20x each R/L        Fwd/retro walk         Tandem walk         Stance EC on foam         Sidestepping   -->        Retesting- see above                           Ther Ex      20'   Cervical SNAGS rotation and ext         Shoulder flexion  Supine  x 12  2#  ea        Sun salute         bridging x15     2x10 w/ core stab   SKTC      10x R/L   DKTC      2x10 B/L   Open books  x10        Wall slides         LTR 5 x     10x 5\" hold R/L   Standing lumbar extension at counter      Continued lumbar assessment/ discussion of symptoms   Prone on elbows         ANN-MARIE w/ knee flx         SLR w/ core stab  X10 ea               15x R/L   Ther Activity Ergonomic education x10,neutral spine                           Gait Training                           Modalities                           HEP:   TM ambulation with 2.0-2.3 mph, 5 min bouts to start on level incline, gradually inc time/speed. Limit symptom increase to 2 points on 10 point scale. Understanding expressed by patient.                "

## 2025-06-10 ENCOUNTER — APPOINTMENT (OUTPATIENT)
Dept: PHYSICAL THERAPY | Facility: CLINIC | Age: 55
End: 2025-06-10
Attending: ORTHOPAEDIC SURGERY
Payer: COMMERCIAL

## 2025-06-10 ENCOUNTER — OFFICE VISIT (OUTPATIENT)
Dept: PHYSICAL THERAPY | Facility: CLINIC | Age: 55
End: 2025-06-10
Attending: INTERNAL MEDICINE
Payer: COMMERCIAL

## 2025-06-10 DIAGNOSIS — M25.562 ACUTE PAIN OF BOTH KNEES: Primary | ICD-10-CM

## 2025-06-10 DIAGNOSIS — M54.2 CERVICALGIA: ICD-10-CM

## 2025-06-10 DIAGNOSIS — S06.0X9D CONCUSSION WITH LOSS OF CONSCIOUSNESS, SUBSEQUENT ENCOUNTER: ICD-10-CM

## 2025-06-10 DIAGNOSIS — S33.5XXD LUMBAR SPRAIN, SUBSEQUENT ENCOUNTER: ICD-10-CM

## 2025-06-10 DIAGNOSIS — H81.90 VESTIBULAR DIZZINESS: ICD-10-CM

## 2025-06-10 DIAGNOSIS — M25.561 ACUTE PAIN OF BOTH KNEES: Primary | ICD-10-CM

## 2025-06-10 PROCEDURE — 97110 THERAPEUTIC EXERCISES: CPT | Performed by: PHYSICAL MEDICINE & REHABILITATION

## 2025-06-10 PROCEDURE — 97112 NEUROMUSCULAR REEDUCATION: CPT | Performed by: PHYSICAL MEDICINE & REHABILITATION

## 2025-06-10 NOTE — PROGRESS NOTES
Daily Note     Today's date: 2025  Patient name: Sanjay Meeks  : 1970  MRN: [id number]  Referring provider: Phil Gar DO  Dx:   Encounter Diagnosis     ICD-10-CM    1. Acute pain of both knees  M25.561     M25.562       2. Cervicalgia  M54.2       3. Lumbar sprain, subsequent encounter  S33.5XXD       4. Vestibular dizziness  H81.90       5. Concussion with loss of consciousness, subsequent encounter  S06.0X9D             Start Time: 174  Stop Time:   Total time in clinic (min): 40 minutes    Subjective: Patient arrives complaints of low back pain noting that symptoms are consistently problematic worse with walking/standing better with rest.      Objective: See treatment diary below      Assessment: Tolerated treatment well. Initiated progression of lumbar stability exercises, worked within tolerance today. Patient demonstrated fatigue post treatment, exhibited good technique with therapeutic exercises, and would benefit from continued PT      Plan: Continue per plan of care.  Progress treatment as tolerated.       Precautions:   Past Medical History:   Diagnosis Date   • Cancer (HCC)     20 years ago, Breast         Outcome Measures Initial Eval  4/3/25 RE  25       mCTSIB  - FTEO (firm)  - FTEC (firm)  - FTEO (foam)  - FTEC (foam) Time, sway  >60 sec, min sway  >60 sec, mod sway  >60 sec, mod sway  13.45 sec, mod-max sway, inc sx   >60 sec, nil  >60 sec min sway  >60 sec, min sway  >60 sec min sway       FGA TBA/30        MARY 35 Errors 19 errors       DHI TBA/100        PSSS   102/132   32/132                                                Precautions: falls, concussion, R elbow fracture-no lifting,   Past Medical History:   Diagnosis Date   • Cancer (HCC)     20 years ago, Breast          Daily Exercise Log:  Start of Care: 4/3/25  POC expires 25    Date 5/29/25 6/5/25 06/10/2025   5/22/25   Visit # 13 14 15   12   Insurance Auth 36 units of ea code        Auth  "Expires 6/23/25                 Manual 10'     6'   STM to cervical spine Suboccipital release, gentle STM to upper cerv mm  TC   Lumbar STM to paraspinals, gentle to moderate pressure utilized based on pt feedback   Manual traction Cervical traction, also with additional of lateral bends w/ traction  TC   B/L LE limb traction intermittent pressure to tolerance                     Neuro Re-Ed 18' 23'    10'   Slouch correct 15x Assessed tolerance for mobility, poor tolerance to horiz towel roll, sig improvement in back pain w/ vertical roll     10x   Sissel seat stabilization  Stabilization in sitting (no sissel): alt hip flx, hip add on ball w/ shld flx w/ pilates ring  Slight rotation w/ pilates ring add  Shld ER into pilates ring       Stance on foam         Single leg stance         BPPV assessment          Blazepods         VORx1         VOR cancel 3x30\"        Neuro testing          Scap stab w/ shld ext, rows Supine shld flx 20x  S/L shld abd and open books 20x each R/L        Fwd/retro walk         Tandem walk         Stance EC on foam         Sidestepping   -->        Retesting- see above         TrA         +TrA   5\" holds 10z      + adduction   3\" holds 20x      + BKFO   YTB at knees 20x R/L      + Marching   20x       Bridging   With adduction 20x      Dead bugs   20x R/L      Sissle         + Marching   20x R/L      + LAQ   20x R/L      + chops   10x R/L      Ther Ex      20'   Cervical SNAGS rotation and ext         Shoulder flexion  Supine  x 12  2#  ea        Sun salute         bridging x15     2x10 w/ core stab   SKTC      10x R/L   DKTC      2x10 B/L   Open books  x10        Wall slides         LTR 5 x     10x 5\" hold R/L   Standing lumbar extension at counter      Continued lumbar assessment/ discussion of symptoms   Prone on elbows         ANN-MARIE w/ knee flx         SLR w/ core stab  X10 ea               15x R/L   Ther Activity Ergonomic education x10,neutral spine                           Gait " Training                           Modalities                           HEP:   TM ambulation with 2.0-2.3 mph, 5 min bouts to start on level incline, gradually inc time/speed. Limit symptom increase to 2 points on 10 point scale. Understanding expressed by patient.

## 2025-06-12 ENCOUNTER — APPOINTMENT (OUTPATIENT)
Dept: PHYSICAL THERAPY | Facility: CLINIC | Age: 55
End: 2025-06-12
Attending: ORTHOPAEDIC SURGERY
Payer: COMMERCIAL

## 2025-06-12 ENCOUNTER — TELEPHONE (OUTPATIENT)
Facility: CLINIC | Age: 55
End: 2025-06-12

## 2025-06-12 NOTE — TELEPHONE ENCOUNTER
Patient called to cancel today's appointment due to being sick. Will continue therapy as able.     Roseanne Mathews, PT, MS, NCS  [facility] Neu[facility] Certified Specialist  [place] Licence #[id number]

## 2025-06-17 ENCOUNTER — APPOINTMENT (OUTPATIENT)
Dept: PHYSICAL THERAPY | Facility: CLINIC | Age: 55
End: 2025-06-17
Attending: ORTHOPAEDIC SURGERY
Payer: COMMERCIAL

## 2025-06-17 ENCOUNTER — APPOINTMENT (OUTPATIENT)
Dept: PHYSICAL THERAPY | Facility: CLINIC | Age: 55
End: 2025-06-17
Attending: INTERNAL MEDICINE
Payer: COMMERCIAL

## 2025-06-18 ENCOUNTER — OFFICE VISIT (OUTPATIENT)
Dept: PHYSICAL THERAPY | Facility: CLINIC | Age: 55
End: 2025-06-18
Attending: INTERNAL MEDICINE
Payer: COMMERCIAL

## 2025-06-18 DIAGNOSIS — S06.0X9D CONCUSSION WITH LOSS OF CONSCIOUSNESS, SUBSEQUENT ENCOUNTER: Primary | ICD-10-CM

## 2025-06-18 DIAGNOSIS — M54.2 CERVICALGIA: ICD-10-CM

## 2025-06-18 PROCEDURE — 97112 NEUROMUSCULAR REEDUCATION: CPT

## 2025-06-18 PROCEDURE — 97110 THERAPEUTIC EXERCISES: CPT

## 2025-06-18 NOTE — PROGRESS NOTES
Daily Note     Today's date: 2025  Patient name: Sanjay Meeks  : 1970  MRN: [id number]  Referring provider: Phil Gar DO  Dx:   Encounter Diagnosis     ICD-10-CM    1. Concussion with loss of consciousness, subsequent encounter  S06.0X9D       2. Cervicalgia  M54.2                      Subjective:  my MD wants to do injections into my neck and lower back.  Back gets so stiff and sore with aLL movemetns but sleeep is ok       Objective: See treatment diary below      Assessment: Tolerated treatment well. Patient demonstrated fatigue post treatment and would benefit from continued PT - fatgues quckly with qped activities and needed frequent breaks       Plan: Continue per plan of care.      Precautions:   Past Medical History:   Diagnosis Date    Cancer (HCC)     20 years ago, Breast         Outcome Measures Initial Eval  4/3/25 RE  25       mCTSIB  - FTEO (firm)  - FTEC (firm)  - FTEO (foam)  - FTEC (foam) Time, sway  >60 sec, min sway  >60 sec, mod sway  >60 sec, mod sway  13.45 sec, mod-max sway, inc sx   >60 sec, nil  >60 sec min sway  >60 sec, min sway  >60 sec min sway       FGA TBA/30        MARY 35 Errors 19 errors       DHI TBA/100  /       PSSS   102/132   32/132                                                Precautions: falls, concussion, R elbow fracture-no lifting,   Past Medical History:   Diagnosis Date    Cancer (HCC)     20 years ago, Breast          Daily Exercise Log:  Start of Care: 4/3/25  POC expires 25    Date 5/29/25 6/5/25 06/10/2025  6/18/25  5/22/25   Visit # 13 14 15 17  12   Insurance Auth 36 units of ea code        Auth Expires 25                 Manual 10'     6'   STM to cervical spine Suboccipital release, gentle STM to upper cerv mm  TC  Lumbar IASTM to paraspinal, skin rolling, MFR to lumbar spine  Lumbar STM to paraspinals, gentle to moderate pressure utilized based on pt feedback   Manual traction Cervical traction, also with  "additional of lateral bends w/ traction  TC   B/L LE limb traction intermittent pressure to tolerance   lub                  Neuro Re-Ed 18' 23'    10'   Slouch correct 15x Assessed tolerance for mobility, poor tolerance to horiz towel roll, sig improvement in back pain w/ vertical roll     10x   Sissel seat stabilization  Stabilization in sitting (no sissel): alt hip flx, hip add on ball w/ shld flx w/ pilates ring  Slight rotation w/ pilates ring add  Shld ER into pilates ring       Stance on foam         Single leg stance         BPPV assessment          Blazepods         VORx1         VOR cancel 3x30\"        Neuro testing          Scap stab w/ shld ext, rows Supine shld flx 20x  S/L shld abd and open books 20x each R/L        Fwd/retro walk         Tandem walk         Stance EC on foam         Sidestepping   -->        Retesting- see above         TrA         +TrA   5\" holds 10z  Dead bugs x20     + adduction   3\" holds 20x X 20     + BKFO   YTB at knees 20x R/L Rtb x 20     + Marching   20x       Bridging   With adduction 20x  x20     Dead bugs   20x R/L  2x 10     Sissle         + Marching   20x R/L      + LAQ   20x R/L      + chops   10x R/L      Ther Ex      20'   Cervical SNAGS rotation and ext         Shoulder flexion  Supine  x 12  2#  ea        Sun salute         bridging x15     2x10 w/ core stab   SKTC      10x R/L   DKTC      2x10 B/L   Open books  x10    x10     Wall slides         LTR 5 x     10x 5\" hold R/L   Standing lumbar extension at counter      Continued lumbar assessment/ discussion of symptoms   Quadruped alt arm  Quadruped alt legs    X10  x10     ANN-MARIE w/ knee flx         SLR w/ core stab  X10 ea               15x R/L   Ther Activity Ergonomic education x10,neutral spine                           Gait Training                           Modalities                           HEP:   TM ambulation with 2.0-2.3 mph, 5 min bouts to start on level incline, gradually inc time/speed. Limit symptom " increase to 2 points on 10 point scale. Understanding expressed by patient.

## 2025-06-19 ENCOUNTER — APPOINTMENT (OUTPATIENT)
Dept: PHYSICAL THERAPY | Facility: CLINIC | Age: 55
End: 2025-06-19
Attending: ORTHOPAEDIC SURGERY
Payer: COMMERCIAL

## 2025-06-19 ENCOUNTER — OFFICE VISIT (OUTPATIENT)
Dept: PHYSICAL THERAPY | Facility: CLINIC | Age: 55
End: 2025-06-19
Attending: INTERNAL MEDICINE
Payer: COMMERCIAL

## 2025-06-19 DIAGNOSIS — M54.2 CERVICALGIA: ICD-10-CM

## 2025-06-19 DIAGNOSIS — M25.562 ACUTE PAIN OF BOTH KNEES: ICD-10-CM

## 2025-06-19 DIAGNOSIS — S33.5XXD LUMBAR SPRAIN, SUBSEQUENT ENCOUNTER: ICD-10-CM

## 2025-06-19 DIAGNOSIS — M25.561 ACUTE PAIN OF BOTH KNEES: ICD-10-CM

## 2025-06-19 DIAGNOSIS — H81.90 VESTIBULAR DIZZINESS: ICD-10-CM

## 2025-06-19 DIAGNOSIS — S06.0X9D CONCUSSION WITH LOSS OF CONSCIOUSNESS, SUBSEQUENT ENCOUNTER: Primary | ICD-10-CM

## 2025-06-19 PROCEDURE — 97110 THERAPEUTIC EXERCISES: CPT | Performed by: PHYSICAL MEDICINE & REHABILITATION

## 2025-06-19 PROCEDURE — 97112 NEUROMUSCULAR REEDUCATION: CPT | Performed by: PHYSICAL MEDICINE & REHABILITATION

## 2025-06-19 NOTE — PROGRESS NOTES
Daily Note     Today's date: 2025  Patient name: Sanjay Meeks  : 1970  MRN: [id number]  Referring provider: Phil Gar DO  Dx:   Encounter Diagnosis     ICD-10-CM    1. Concussion with loss of consciousness, subsequent encounter  S06.0X9D       2. Cervicalgia  M54.2       3. Acute pain of both knees  M25.561     M25.562       4. Lumbar sprain, subsequent encounter  S33.5XXD       5. Vestibular dizziness  H81.90           Start Time: 1700  Stop Time: 1740  Total time in clinic (min): 40 minutes    Subjective:  Patient reports that she felt a significant improvement in her low back following last session.      Objective: See treatment diary below      Assessment: Tolerated treatment well. Patient continues to benefit from myofascial work. Patient demonstrated fatigue post treatment and would benefit from continued PT     Plan: Continue per plan of care.      Precautions:   Past Medical History:   Diagnosis Date   • Cancer (HCC)     20 years ago, Breast         Outcome Measures Initial Eval  4/3/25 RE  25       mCTSIB  - FTEO (firm)  - FTEC (firm)  - FTEO (foam)  - FTEC (foam) Time, sway  >60 sec, min sway  >60 sec, mod sway  >60 sec, mod sway  13.45 sec, mod-max sway, inc sx   >60 sec, nil  >60 sec min sway  >60 sec, min sway  >60 sec min sway       FGA TBA/30        MARY 35 Errors 19 errors       DHI TBA/100 6 /100       PSSS   102/132   32/132                                                Precautions: falls, concussion, R elbow fracture-no lifting,   Past Medical History:   Diagnosis Date   • Cancer (HCC)     20 years ago, Breast          Daily Exercise Log:  Start of Care: 4/3/25  POC expires 25    Date 5/29/25 6/5/25 06/10/2025  6/18/25 2025 5/22/25   Visit # 13 14 15 17 18 12   Insurance Auth 36 units of ea code        Auth Expires 25                 Manual 10'     6'   STM to cervical spine Suboccipital release, gentle STM to upper cerv mm  TC  Lumbar IASTM to  "paraspinal, skin rolling, MFR to lumbar spine IASTM to paraspinals Lumbar STM to paraspinals, gentle to moderate pressure utilized based on pt feedback   Manual traction Cervical traction, also with additional of lateral bends w/ traction  TC   B/L LE limb traction intermittent pressure to tolerance   lub                  Neuro Re-Ed 18' 23'    10'   Slouch correct 15x Assessed tolerance for mobility, poor tolerance to horiz towel roll, sig improvement in back pain w/ vertical roll     10x   Sissel seat stabilization  Stabilization in sitting (no sissel): alt hip flx, hip add on ball w/ shld flx w/ pilates ring  Slight rotation w/ pilates ring add  Shld ER into pilates ring       Stance on foam         Single leg stance         BPPV assessment          Blazepods         VORx1         VOR cancel 3x30\"        Neuro testing          Scap stab w/ shld ext, rows Supine shld flx 20x  S/L shld abd and open books 20x each R/L        Fwd/retro walk         Tandem walk         Stance EC on foam         Sidestepping   -->        Retesting- see above         TrA         +TrA   5\" holds 10z  Dead bugs x20     + adduction   3\" holds 20x X 20     + BKFO   YTB at knees 20x R/L Rtb x 20 Rtb x 20    + Marching   20x       Bridging   With adduction 20x  x20 x20    Dead bugs   20x R/L  2x 10     Sissle         + Marching   20x R/L  20x R/L    + LAQ   20x R/L      + chops   10x R/L      Ther Ex      20'   Cervical SNAGS rotation and ext         Shoulder flexion  Supine  x 12  2#  ea        Sun salute         bridging x15     2x10 w/ core stab   SKTC      10x R/L   DKTC      2x10 B/L   Open books  x10    x10 x10 R/L    Wall slides         LTR 5 x     10x 5\" hold R/L   Standing lumbar extension at counter      Continued lumbar assessment/ discussion of symptoms   Quadruped alt arm  Quadruped alt legs    X10  x10 X10 R/L    ANN-MARIE w/ knee flx         SLR w/ core stab  X10 ea               15x R/L   Ther Activity Ergonomic education " x10,neutral spine                           Gait Training                           Modalities                           HEP:   TM ambulation with 2.0-2.3 mph, 5 min bouts to start on level incline, gradually inc time/speed. Limit symptom increase to 2 points on 10 point scale. Understanding expressed by patient.

## 2025-06-24 ENCOUNTER — EVALUATION (OUTPATIENT)
Dept: PHYSICAL THERAPY | Facility: CLINIC | Age: 55
End: 2025-06-24
Attending: INTERNAL MEDICINE
Payer: COMMERCIAL

## 2025-06-24 ENCOUNTER — APPOINTMENT (OUTPATIENT)
Dept: PHYSICAL THERAPY | Facility: CLINIC | Age: 55
End: 2025-06-24
Attending: ORTHOPAEDIC SURGERY
Payer: COMMERCIAL

## 2025-06-24 DIAGNOSIS — M25.562 ACUTE PAIN OF BOTH KNEES: ICD-10-CM

## 2025-06-24 DIAGNOSIS — M54.2 CERVICALGIA: ICD-10-CM

## 2025-06-24 DIAGNOSIS — S33.5XXD LUMBAR SPRAIN, SUBSEQUENT ENCOUNTER: ICD-10-CM

## 2025-06-24 DIAGNOSIS — H81.90 VESTIBULAR DIZZINESS: ICD-10-CM

## 2025-06-24 DIAGNOSIS — S06.0X9D CONCUSSION WITH LOSS OF CONSCIOUSNESS, SUBSEQUENT ENCOUNTER: Primary | ICD-10-CM

## 2025-06-24 DIAGNOSIS — M25.561 ACUTE PAIN OF BOTH KNEES: ICD-10-CM

## 2025-06-24 PROCEDURE — 97112 NEUROMUSCULAR REEDUCATION: CPT | Performed by: PHYSICAL MEDICINE & REHABILITATION

## 2025-06-24 PROCEDURE — 97110 THERAPEUTIC EXERCISES: CPT | Performed by: PHYSICAL MEDICINE & REHABILITATION
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
## 2025-06-26 ENCOUNTER — APPOINTMENT (OUTPATIENT)
Dept: PHYSICAL THERAPY | Facility: CLINIC | Age: 55
End: 2025-06-26
Attending: INTERNAL MEDICINE
Payer: COMMERCIAL

## 2025-06-26 ENCOUNTER — APPOINTMENT (OUTPATIENT)
Dept: PHYSICAL THERAPY | Facility: CLINIC | Age: 55
End: 2025-06-26
Attending: ORTHOPAEDIC SURGERY
Payer: COMMERCIAL

## 2025-06-30 ENCOUNTER — OFFICE VISIT (OUTPATIENT)
Dept: PHYSICAL THERAPY | Facility: CLINIC | Age: 55
End: 2025-06-30
Attending: INTERNAL MEDICINE
Payer: COMMERCIAL

## 2025-06-30 DIAGNOSIS — S06.0X9D CONCUSSION WITH LOSS OF CONSCIOUSNESS, SUBSEQUENT ENCOUNTER: Primary | ICD-10-CM

## 2025-06-30 DIAGNOSIS — M54.2 CERVICALGIA: ICD-10-CM

## 2025-06-30 PROCEDURE — 97140 MANUAL THERAPY 1/> REGIONS: CPT

## 2025-06-30 PROCEDURE — 97110 THERAPEUTIC EXERCISES: CPT

## 2025-06-30 PROCEDURE — 97112 NEUROMUSCULAR REEDUCATION: CPT

## 2025-06-30 NOTE — PROGRESS NOTES
"Daily Note     Today's date: 2025  Patient name: Sanjay Meeks  : 1970  MRN: [id number]  Referring provider: Phil Gar DO  Dx:   Encounter Diagnosis     ICD-10-CM    1. Concussion with loss of consciousness, subsequent encounter  S06.0X9D       2. Cervicalgia  M54.2                      Subjective: neck Is really sore today and I can't turn it too well either way      Objective: See treatment diary below      Assessment: Tolerated treatment well. Patient would benefit from continued PT- mild improvement in rotation after graston and mwm's.  Very stiff and hypomobile cervical spine c3- t1       Plan: Continue per plan of care.      Precautions:   POC expires 25     Date 5/29/25 6/5/25 06/10/2025  6/18/25  6/30/25 5/22/25   Visit # 13 14 15 17   12   Insurance Auth 36 units of ea code             Auth Expires 25                             Manual 10'         6'   STM to cervical spine Suboccipital release, gentle STM to upper cerv mm   TC  Lumbar IASTM to paraspinal, skin rolling, MFR to lumbar spine  graston to upper c'/s in neutral and midrange rot and sb Lumbar STM to paraspinals, gentle to moderate pressure utilized based on pt feedback   Manual traction Cervical traction, also with additional of lateral bends w/ traction   TC    SNAGS TO INCR. EXTENSION manual and self mobs B/L LE limb traction intermittent pressure to tolerance   lub                               Neuro Re-Ed 18' 23'       10'   Slouch correct 15x Assessed tolerance for mobility, poor tolerance to horiz towel roll, sig improvement in back pain w/ vertical roll        10x   Sissel seat stabilization   Stabilization in sitting (no sissel): alt hip flx, hip add on ball w/ shld flx w/ pilates ring  Slight rotation w/ pilates ring add  Shld ER into pilates ring           Stance on foam               Single leg stance               BPPV assessment                Blazepods               VORx1               VOR cancel 3x30\" " "            Neuro testing                Scap stab w/ shld ext, rows Supine shld flx 20x  S/L shld abd and open books 20x each R/L             Fwd/retro walk               Tandem walk               Stance EC on foam               Sidestepping   -->             Retesting- see above               TrA               +TrA     5\" holds 10z  Dead bugs x20       + adduction     3\" holds 20x X 20       + BKFO     YTB at knees 20x R/L Rtb x 20       + Marching     20x          Bridging     With adduction 20x  x20       Dead bugs     20x R/L  2x 10       Sissle               + Marching     20x R/L         + LAQ     20x R/L         + chops     10x R/L         Ther Ex           20'   Cervical SNAGS rotation and ext          x 10 manual  X10 self for extension     Shoulder flexion  Supine  x 12  2#  ea             Sun salute               bridging x15         2x10 w/ core stab   SKTC           10x R/L   DKTC           2x10 B/L   Open books  x10      x10  x10     Band rows/ext           x20 rtb     Band  tip 5 x        x20 rtb  10x 5\" hold R/L   Chin tucks           x10 Continued lumbar assessment/ discussion of symptoms   Quadruped alt arm  Quadruped alt legs       X10  x10       ANN-MARIE w/ knee flx               SLR w/ core stab  X10 ea                          15x R/L   Ther Activity Ergonomic education x10,neutral spine         rgonomic education x10,neutral spine  5mins                                     Gait Training                                               Modalities                                               HEP:   TM ambulation with 2.0-2.3 mph, 5 min bouts to start on level incline, gradually inc time/speed. Limit symptom increase to 2 points on 10 point scale. Understanding expressed by patient.     Past Medical History:   Diagnosis Date    Cancer (HCC)     20 years ago, Breast       Date 6/30/25 6/5/25 06/10/2025  6/18/25 06/19/2025 06/24/2025   Visit # 13 14 15 17 18    Insurance Auth 36 units of ea code    " "    Auth Expires 6/23/25                 Manual 10'        STM to cervical spine Suboccipital release, gentle STM to upper cerv mm  TC  Lumbar IASTM to paraspinal, skin rolling, MFR to lumbar spine IASTM to paraspinals IASTM to paraspinals, gentle lumbar mobilizations - rotational   Manual traction Cervical traction, also with additional of lateral bends w/ traction  TC   TC   lub                  Neuro Re-Ed 18' 23'       Slouch correct 15x Assessed tolerance for mobility, poor tolerance to horiz towel roll, sig improvement in back pain w/ vertical roll        Sissel seat stabilization  Stabilization in sitting (no sissel): alt hip flx, hip add on ball w/ shld flx w/ pilates ring  Slight rotation w/ pilates ring add  Shld ER into pilates ring       Stance on foam         Single leg stance         BPPV assessment          Blazepods         VORx1         VOR cancel 3x30\"        Neuro testing          Scap stab w/ shld ext, rows Supine shld flx 20x  S/L shld abd and open books 20x each R/L        Fwd/retro walk         Tandem walk         Stance EC on foam         Sidestepping   -->        Retesting- see above         TrA         +TrA   5\" holds 10z  Dead bugs x20     + adduction   3\" holds 20x X 20       + BKFO      YTB at knees 20x R/L  Rtb x 20  Rtb x 20  RTB with TrA  + Marching      20x         Bridging      With adduction 20x   x20  x20  With adduction  Dead bugs      20x R/L   2x 10      Sissle              + Marching      20x R/L    20x R/L    + LAQ      20x R/L        + chops      10x R/L        Ther Ex              Cervical SNAGS rotation and ext              Shoulder flexion   Supine  x 12  2#  ea            Sun salute              bridging  x15            SKTC              DKTC              Open books   x10       x10  x10 R/L  x10 R/L  Wall slides              LTR  5 x            Standing lumbar extension at counter              Quadruped alt arm  Quadruped alt legs        X10  x10  X10 R/L  X10 R/L  ANN-MARIE " w/ knee flx              SLR w/ core stab   X10 ea             STS            From chair with airex 10x  Ther Activity  Ergonomic education x10,neutral spine                                         Gait Training                                          Modalities

## 2025-07-11 ENCOUNTER — OFFICE VISIT (OUTPATIENT)
Dept: PHYSICAL THERAPY | Facility: CLINIC | Age: 55
End: 2025-07-11
Attending: INTERNAL MEDICINE
Payer: COMMERCIAL

## 2025-07-11 DIAGNOSIS — M54.16 LUMBAR RADICULOPATHY: ICD-10-CM

## 2025-07-11 DIAGNOSIS — M54.2 CERVICALGIA: Primary | ICD-10-CM

## 2025-07-11 PROCEDURE — 97140 MANUAL THERAPY 1/> REGIONS: CPT

## 2025-07-11 PROCEDURE — 97112 NEUROMUSCULAR REEDUCATION: CPT

## 2025-07-11 PROCEDURE — 97110 THERAPEUTIC EXERCISES: CPT

## 2025-07-11 NOTE — PROGRESS NOTES
Daily Note     Today's date: 2025  Patient name: Sanjay Meeks  : 1970  MRN: [id number]  Referring provider: Phil Gar DO  Dx:   Encounter Diagnosis     ICD-10-CM    1. Cervicalgia  M54.2       2. Lumbar radiculopathy  M54.16                      Subjective: increased pain today and irritated right side of my cervical spine into right hand and down the blade and into my right lumbar spine paraspinea. I did fair while I was away for 4 days for work in [place] , but today I am more sore       Objective: See treatment diary below      Assessment: Tolerated treatment well. Patient exhibited good technique with therapeutic exercises and would benefit from continued PT . Unable to progress exercises today due to increased right Upper qtr. Pain extending into right UE      Plan: Continue per plan of care.      Precautions:   POC expires 25     Date 5/29/25 6/5/25 06/10/2025  6/18/25  6/30/25 7/11/25   Visit # 13 14 15 17   12   Insurance Auth 36 units of ea code             Auth Expires 25                             Manual 10'         6'   STM to cervical spine Suboccipital release, gentle STM to upper cerv mm   TC  Lumbar IASTM to paraspinal, skin rolling, MFR to lumbar spine  graston to upper c'/s in neutral and midrange rot and sb Cervical STM to paraspinals, and IASTM to bilateral upper qtr.s   Manual traction Cervical traction, also with additional of lateral bends w/ traction   TC    SNAGS TO INCR. EXTENSION manual and self mobs B/L LE limb traction intermittent pressure to tolerance   lub                               Neuro Re-Ed 18' 23'       10'   Slouch correct 15x Assessed tolerance for mobility, poor tolerance to horiz towel roll, sig improvement in back pain w/ vertical roll        10x   Sissel seat stabilization   Stabilization in sitting (no sissel): alt hip flx, hip add on ball w/ shld flx w/ pilates ring  Slight rotation w/ pilates ring add  Shld ER into pilates ring          "  Stance on foam               Single leg stance               BPPV assessment                Blazepods               VORx1               VOR cancel 3x30\"             Neuro testing                Scap stab w/ shld ext, rows Supine shld flx 20x  S/L shld abd and open books 20x each R/L             Fwd/retro walk               Tandem walk               Stance EC on foam               Sidestepping   -->             Retesting- see above               TrA               +TrA     5\" holds 10z  Dead bugs x20    2# x 20    + adduction     3\" holds 20x X 20       + BKFO     YTB at knees 20x R/L Rtb x 20       + Marching     20x          Bridging     With adduction 20x  x20    x 20 with rtb    Dead bugs     20x R/L  2x 10    2# unsupported x 10 ea    Sissle               + Marching     20x R/L         + LAQ     20x R/L         + chops     10x R/L         Ther Ex           20'   Cervical SNAGS rotation and ext          x 10 manual  X10 self for extension     Shoulder flexion  Supine  x 12  2#  ea             Sun salute               bridging x15         2x10 w/ core stab rtb  around knees    SKTC              DKTC              Open books  x10      x10  x10  x10   Band rows/ext           x20 rtb  x 20 rtb    Band  tip 5 x        x20 rtb  10x 5\" hold R/L ytb    Chin tucks           x10 Continued lumbar assessment/ discussion of symptoms   Quadruped alt arm  Quadruped alt legs       X10  x10    bird dogs attempted but too painful    ANN-MARIE w/ knee flx               SLR w/ core stab  X10 ea                          15x R/L   Ther Activity Ergonomic education x10,neutral spine         rgonomic education x10,neutral spine  5mins                                     Gait Training                                               Modalities                                               HEP:   TM ambulation with 2.0-2.3 mph, 5 min bouts to start on level incline, gradually inc time/speed. Limit symptom increase to 2 points on 10 point " "scale. Understanding expressed by patient.     Past Medical History:   Diagnosis Date    Cancer (HCC)     20 years ago, Breast       Date 6/30/25 6/5/25 06/10/2025  6/18/25 06/19/2025 06/24/2025   Visit # 13 14 15 17 18    Insurance Auth 36 units of ea code        Auth Expires 6/23/25                 Manual 10'        STM to cervical spine Suboccipital release, gentle STM to upper cerv mm  TC  Lumbar IASTM to paraspinal, skin rolling, MFR to lumbar spine IASTM to paraspinals IASTM to paraspinals, gentle lumbar during ball rollouts    Manual traction Cervical traction, also with additional of lateral bends w/ traction  TC      lub                  Neuro Re-Ed 18' 23'       Slouch correct 15x Assessed tolerance for mobility, poor tolerance to horiz towel roll, sig improvement in back pain w/ vertical roll        Sissel seat stabilization  Stabilization in sitting (no sissel): alt hip flx, hip add on ball w/ shld flx w/ pilates ring  Slight rotation w/ pilates ring add  Shld ER into pilates ring       Stance on foam         Single leg stance         BPPV assessment          Blazepods         VORx1         VOR cancel 3x30\"        Neuro testing          Scap stab w/ shld ext, rows Supine shld flx 20x  S/L shld abd and open books 20x each R/L        Fwd/retro walk         Tandem walk         Stance EC on foam         Sidestepping   -->        Retesting- see above         TrA         +TrA   5\" holds 10z  Dead bugs x20     + adduction   3\" holds 20x X 20       + BKFO      YTB at knees 20x R/L  Rtb x 20  Rtb x 20  RTB with TrA  + Marching      20x         Bridging      With adduction 20x   x20  x20  With adduction  Dead bugs      20x R/L   2x 10      Sissle              + Marching      20x R/L    20x R/L    + LAQ      20x R/L        + chops      10x R/L        Ther Ex              Cervical SNAGS rotation and ext              Shoulder flexion   Supine  x 12  2#  ea            Sun " salute              bridging  x15            SKTC              TC              Open books   x10       x10  x10 R/L  x10 R/L  Wall slides              LTR  5 x            Standing lumbar extension at counter              Quadruped alt arm  Quadruped alt legs        X10  x10  X10 R/L  X10 R/L  ANN-MARIE w/ knee flx              SLR w/ core stab   X10 ea             STS            From chair with airex 10x  Ther Activity  Ergonomic education x10,neutral spine                                         Gait Training                                          Modalities

## 2025-07-21 ENCOUNTER — OFFICE VISIT (OUTPATIENT)
Dept: PHYSICAL THERAPY | Facility: CLINIC | Age: 55
End: 2025-07-21
Attending: INTERNAL MEDICINE
Payer: COMMERCIAL

## 2025-07-21 DIAGNOSIS — M54.2 CERVICALGIA: Primary | ICD-10-CM

## 2025-07-21 DIAGNOSIS — M25.561 ACUTE PAIN OF BOTH KNEES: ICD-10-CM

## 2025-07-21 DIAGNOSIS — M54.16 LUMBAR RADICULOPATHY: ICD-10-CM

## 2025-07-21 DIAGNOSIS — S33.5XXD LUMBAR SPRAIN, SUBSEQUENT ENCOUNTER: ICD-10-CM

## 2025-07-21 DIAGNOSIS — M25.562 ACUTE PAIN OF BOTH KNEES: ICD-10-CM

## 2025-07-21 DIAGNOSIS — S06.0X9D CONCUSSION WITH LOSS OF CONSCIOUSNESS, SUBSEQUENT ENCOUNTER: ICD-10-CM

## 2025-07-21 PROCEDURE — 97112 NEUROMUSCULAR REEDUCATION: CPT | Performed by: PHYSICAL MEDICINE & REHABILITATION

## 2025-07-21 PROCEDURE — 97140 MANUAL THERAPY 1/> REGIONS: CPT | Performed by: PHYSICAL MEDICINE & REHABILITATION

## 2025-07-21 PROCEDURE — 97110 THERAPEUTIC EXERCISES: CPT | Performed by: PHYSICAL MEDICINE & REHABILITATION

## 2025-07-21 NOTE — PROGRESS NOTES
Daily Note     Today's date: 2025  Patient name: Sanjay Meeks  : 1970  MRN: [id number]  Referring provider: Phil Gar DO  Dx:   Encounter Diagnosis     ICD-10-CM    1. Cervicalgia  M54.2       2. Lumbar radiculopathy  M54.16       3. Acute pain of both knees  M25.561     M25.562       4. Lumbar sprain, subsequent encounter  S33.5XXD       5. Concussion with loss of consciousness, subsequent encounter  S06.0X9D             Start Time: 1700  Stop Time: 1740  Total time in clinic (min): 40 minutes    Subjective: Patient reports upon arrival she has pain down her right medial scapular boarder that has been persistent.      Objective: See treatment diary below      Assessment: Tolerated treatment well. Focused today on gentle cervical extension with abolishment of distal symptoms. Advised patient on HEP. Patient exhibited good technique with therapeutic exercises and would benefit from continued PT.      Plan: Continue per plan of care.      Precautions:   POC expires 25     Date 5/29/25 2025 06/10/2025  6/18/25  6/30/25 7/11/25   Visit # 13 14 15 17   12   Insurance Auth 36 units of ea code             Auth Expires 25                             Manual 10'         6'   STM to cervical spine Suboccipital release, gentle STM to upper cerv mm  TC TC  Lumbar IASTM to paraspinal, skin rolling, MFR to lumbar spine  graston to upper c'/s in neutral and midrange rot and sb Cervical STM to paraspinals, and IASTM to bilateral upper qtr.s   Manual traction Cervical traction, also with additional of lateral bends w/ traction  TC  Cervical traction with mobilizations for extension/rotation/lateral flexion    SNAGS TO INCR. EXTENSION manual and self mobs B/L LE limb traction intermittent pressure to tolerance   lub                               Neuro Re-Ed 18' 23'       10'   Slouch correct 15x Assessed tolerance for mobility, poor tolerance to horiz towel roll, sig improvement in back pain w/  "vertical roll   SNAG for extension     10x   Sissel seat stabilization   Stabilization in sitting (no sissel): alt hip flx, hip add on ball w/ shld flx w/ pilates ring  Slight rotation w/ pilates ring add  Shld ER into pilates ring           Stance on foam               Single leg stance               BPPV assessment                Blazepods               VORx1               VOR cancel 3x30\"             Neuro testing                Scap stab w/ shld ext, rows Supine shld flx 20x  S/L shld abd and open books 20x each R/L             Fwd/retro walk               Tandem walk               Stance EC on foam               Sidestepping   -->             Retesting- see above               TrA               +TrA       Dead bugs x20    2# x 20    + adduction      X 20       + BKFO     YTB at knees 20x R/L Rtb x 20       + Marching     20x          Bridging     With RTB abduction  x20    x 20 with rtb    Dead bugs     20x R/L  2x 10    2# unsupported x 10 ea    Sissle               + Marching              + LAQ              + chops              Ther Ex           20'   Cervical SNAGS rotation and ext          x 10 manual  X10 self for extension     Shoulder flexion  Supine  x 12  2#  ea             Sun salute               bridging x15         2x10 w/ core stab rtb  around knees    SKTC              DKTC              Open books  x10      x10  x10  x10   Band rows/ext       RTB 20x each    x20 rtb  x 20 rtb    Band  tip 5 x    Shld ER RTB 20x    x20 rtb  10x 5\" hold R/L ytb    Chin tucks           x10 Continued lumbar assessment/ discussion of symptoms   Quadruped alt arm  Quadruped alt legs       X10  x10    bird dogs attempted but too painful    ANN-MARIE w/ knee flx               SLR w/ core stab  X10 ea     10x R/L                     15x R/L   Ther Activity Ergonomic education x10,neutral spine         rgonomic education x10,neutral spine  5mins                                     Gait Training                             "                   Modalities

## 2025-07-24 ENCOUNTER — OFFICE VISIT (OUTPATIENT)
Dept: PHYSICAL THERAPY | Facility: CLINIC | Age: 55
End: 2025-07-24
Attending: INTERNAL MEDICINE
Payer: COMMERCIAL

## 2025-07-24 DIAGNOSIS — M25.561 ACUTE PAIN OF BOTH KNEES: ICD-10-CM

## 2025-07-24 DIAGNOSIS — S33.5XXD LUMBAR SPRAIN, SUBSEQUENT ENCOUNTER: ICD-10-CM

## 2025-07-24 DIAGNOSIS — M54.16 LUMBAR RADICULOPATHY: ICD-10-CM

## 2025-07-24 DIAGNOSIS — M25.562 ACUTE PAIN OF BOTH KNEES: ICD-10-CM

## 2025-07-24 DIAGNOSIS — M54.2 CERVICALGIA: Primary | ICD-10-CM

## 2025-07-24 PROCEDURE — 97140 MANUAL THERAPY 1/> REGIONS: CPT | Performed by: PHYSICAL MEDICINE & REHABILITATION

## 2025-07-24 NOTE — PROGRESS NOTES
Daily Note     Today's date: 2025  Patient name: Sanjay Meeks  : 1970  MRN: [id number]  Referring provider: Phil Gar DO  Dx:   Encounter Diagnosis     ICD-10-CM    1. Cervicalgia  M54.2       2. Lumbar radiculopathy  M54.16       3. Acute pain of both knees  M25.561     M25.562       4. Lumbar sprain, subsequent encounter  S33.5XXD             Start Time: 1700  Stop Time: 1715  Total time in clinic (min): 15 minutes    Subjective: Patient reports she underwent injections with pain management for her low back since last visit stating significant improvement in symptoms of low back pain. Patient does reports she continues to have numbness down her RLE when walking.      Objective: See treatment diary below      Assessment: Tolerated treatment well. Abbreviated session due to conflicting appointments. Patient exhibited good technique with therapeutic exercises and would benefit from continued PT.      Plan: Continue per plan of care.      Precautions:   POC expires 25     Date 5/29/25 2025 06/10/2025  6/18/25 2025 7/11/25   Visit # 13 14 15 17 18 12   Insurance Auth 36 units of ea code            Auth Expires 25                           Manual 10'        6'   STM to cervical spine Suboccipital release, gentle STM to upper cerv mm  TC TC  Lumbar IASTM to paraspinal, skin rolling, MFR to lumbar spine  Cervical STM to paraspinals, and IASTM to bilateral upper qtr.s   Manual traction Cervical traction, also with additional of lateral bends w/ traction  TC  Cervical traction with mobilizations for extension/rotation/lateral flexion   TC w/ mobilization B/L LE limb traction intermittent pressure to tolerance   lub                             Neuro Re-Ed 18' 23'      10'   Slouch correct 15x Assessed tolerance for mobility, poor tolerance to horiz towel roll, sig improvement in back pain w/ vertical roll   SNAG for extension    10x   Sissel seat stabilization   Stabilization in  "sitting (no sissel): alt hip flx, hip add on ball w/ shld flx w/ pilates ring  Slight rotation w/ pilates ring add  Shld ER into pilates ring          Stance on foam              Single leg stance              BPPV assessment               Blazepods              VORx1              VOR cancel 3x30\"            Neuro testing               Scap stab w/ shld ext, rows Supine shld flx 20x  S/L shld abd and open books 20x each R/L            Fwd/retro walk              Tandem walk              Stance EC on foam              Sidestepping   -->            Retesting- see above              TrA              +TrA       Dead bugs x20   2# x 20    + adduction      X 20      + BKFO     YTB at knees 20x R/L Rtb x 20      + Marching     20x         Bridging     With RTB abduction  x20   x 20 with rtb    Dead bugs     20x R/L  2x 10   2# unsupported x 10 ea    Sissle              + Marching             + LAQ             + chops             Ther Ex          20'   Cervical SNAGS rotation and ext              Shoulder flexion  Supine  x 12  2#  ea            Sun salute              bridging x15        2x10 w/ core stab rtb  around knees    SKTC             DKTC             Open books  x10      x10   x10   Band rows/ext       RTB 20x each     x 20 rtb    Band  tip 5 x    Shld ER RTB 20x    10x 5\" hold R/L ytb    Chin tucks           Continued lumbar assessment/ discussion of symptoms   Quadruped alt arm  Quadruped alt legs       X10  x10   bird dogs attempted but too painful    ANN-MARIE w/ knee flx              SLR w/ core stab  X10 ea     10x R/L                   15x R/L   Ther Activity Ergonomic education x10,neutral spine                                           Gait Training                                            Modalities                                                     "

## 2025-07-28 ENCOUNTER — OFFICE VISIT (OUTPATIENT)
Dept: PHYSICAL THERAPY | Facility: CLINIC | Age: 55
End: 2025-07-28
Attending: INTERNAL MEDICINE
Payer: COMMERCIAL

## 2025-07-28 DIAGNOSIS — M54.2 CERVICALGIA: ICD-10-CM

## 2025-07-28 DIAGNOSIS — S06.0X9D CONCUSSION WITH LOSS OF CONSCIOUSNESS, SUBSEQUENT ENCOUNTER: Primary | ICD-10-CM

## 2025-07-28 PROCEDURE — 97110 THERAPEUTIC EXERCISES: CPT

## 2025-07-28 PROCEDURE — 97112 NEUROMUSCULAR REEDUCATION: CPT

## 2025-07-28 PROCEDURE — 97140 MANUAL THERAPY 1/> REGIONS: CPT

## 2025-07-31 ENCOUNTER — OFFICE VISIT (OUTPATIENT)
Dept: PHYSICAL THERAPY | Facility: CLINIC | Age: 55
End: 2025-07-31
Attending: INTERNAL MEDICINE
Payer: COMMERCIAL

## 2025-07-31 DIAGNOSIS — S33.5XXD LUMBAR SPRAIN, SUBSEQUENT ENCOUNTER: Primary | ICD-10-CM

## 2025-07-31 PROCEDURE — 97112 NEUROMUSCULAR REEDUCATION: CPT

## 2025-07-31 PROCEDURE — 97110 THERAPEUTIC EXERCISES: CPT

## 2025-07-31 PROCEDURE — 97140 MANUAL THERAPY 1/> REGIONS: CPT

## 2025-08-07 ENCOUNTER — OFFICE VISIT (OUTPATIENT)
Dept: PHYSICAL THERAPY | Facility: CLINIC | Age: 55
End: 2025-08-07
Attending: INTERNAL MEDICINE
Payer: COMMERCIAL

## 2025-08-07 DIAGNOSIS — M25.561 ACUTE PAIN OF BOTH KNEES: Primary | ICD-10-CM

## 2025-08-07 DIAGNOSIS — M54.16 LUMBAR RADICULOPATHY: ICD-10-CM

## 2025-08-07 DIAGNOSIS — M25.562 ACUTE PAIN OF BOTH KNEES: Primary | ICD-10-CM

## 2025-08-07 DIAGNOSIS — M54.2 CERVICALGIA: ICD-10-CM

## 2025-08-07 PROCEDURE — 97110 THERAPEUTIC EXERCISES: CPT

## 2025-08-07 PROCEDURE — 97140 MANUAL THERAPY 1/> REGIONS: CPT

## 2025-08-07 PROCEDURE — 97112 NEUROMUSCULAR REEDUCATION: CPT

## 2025-08-11 ENCOUNTER — EVALUATION (OUTPATIENT)
Dept: PHYSICAL THERAPY | Facility: CLINIC | Age: 55
End: 2025-08-11
Attending: INTERNAL MEDICINE
Payer: COMMERCIAL

## 2025-08-21 ENCOUNTER — OFFICE VISIT (OUTPATIENT)
Dept: PHYSICAL THERAPY | Facility: CLINIC | Age: 55
End: 2025-08-21
Attending: INTERNAL MEDICINE
Payer: COMMERCIAL

## 2025-08-21 DIAGNOSIS — S33.5XXD LUMBAR SPRAIN, SUBSEQUENT ENCOUNTER: ICD-10-CM

## 2025-08-21 DIAGNOSIS — M25.561 ACUTE PAIN OF BOTH KNEES: Primary | ICD-10-CM

## 2025-08-21 DIAGNOSIS — M25.562 ACUTE PAIN OF BOTH KNEES: Primary | ICD-10-CM

## 2025-08-21 DIAGNOSIS — M54.2 CERVICALGIA: ICD-10-CM

## 2025-08-21 PROCEDURE — 97112 NEUROMUSCULAR REEDUCATION: CPT

## 2025-08-21 PROCEDURE — 97140 MANUAL THERAPY 1/> REGIONS: CPT

## 2025-08-21 PROCEDURE — 97110 THERAPEUTIC EXERCISES: CPT
